# Patient Record
Sex: MALE | Race: WHITE | Employment: FULL TIME | URBAN - METROPOLITAN AREA
[De-identification: names, ages, dates, MRNs, and addresses within clinical notes are randomized per-mention and may not be internally consistent; named-entity substitution may affect disease eponyms.]

---

## 2017-04-13 DIAGNOSIS — I10 ESSENTIAL HYPERTENSION: ICD-10-CM

## 2017-04-13 RX ORDER — AMLODIPINE AND VALSARTAN 10; 320 MG/1; MG/1
TABLET ORAL
Qty: 90 TAB | Refills: 1 | Status: SHIPPED | OUTPATIENT
Start: 2017-04-13 | End: 2017-10-14 | Stop reason: SDUPTHER

## 2017-08-02 ENCOUNTER — TELEPHONE (OUTPATIENT)
Dept: INTERNAL MEDICINE CLINIC | Age: 47
End: 2017-08-02

## 2017-08-02 DIAGNOSIS — E78.1 HYPERTRIGLYCERIDEMIA: Primary | ICD-10-CM

## 2017-08-02 DIAGNOSIS — I10 ESSENTIAL HYPERTENSION: ICD-10-CM

## 2017-08-02 DIAGNOSIS — R73.01 FASTING HYPERGLYCEMIA: ICD-10-CM

## 2017-08-02 NOTE — TELEPHONE ENCOUNTER
----- Message from Licha Duff sent at 8/2/2017  1:35 PM EDT -----  Regarding: Dr. Lidia Dillon telephone  Contact: [phone]  Pt is requesting an order be put in for lab work with A1C before his appt on 8/31/17 so it can be reviewed at [facility].  Pt's best contact number is [phone]

## 2017-08-02 NOTE — TELEPHONE ENCOUNTER
Patient plans to come to the office to have lab collected. Order pending. Please add on addition orders if needed.  Patient wishes to have results available to discuss at office visit

## 2017-08-03 NOTE — TELEPHONE ENCOUNTER
Orders signed. Dr Dereck Osgood      Patient advised that lab orders have been signed and of lab office hours.    Left voicemail message with the above mentioned information

## 2017-10-14 DIAGNOSIS — I10 ESSENTIAL HYPERTENSION: ICD-10-CM

## 2017-10-16 RX ORDER — AMLODIPINE AND VALSARTAN 10; 320 MG/1; MG/1
TABLET ORAL
Qty: 90 TAB | Refills: 0 | Status: SHIPPED | OUTPATIENT
Start: 2017-10-16 | End: 2018-01-23 | Stop reason: SDUPTHER

## 2017-10-17 NOTE — TELEPHONE ENCOUNTER
Attempted to contact patient without success.  Left voicemail message requesting a call back to the office

## 2018-01-23 DIAGNOSIS — I10 ESSENTIAL HYPERTENSION: ICD-10-CM

## 2018-01-24 RX ORDER — AMLODIPINE AND VALSARTAN 10; 320 MG/1; MG/1
TABLET ORAL
Qty: 90 TAB | Refills: 0 | Status: SHIPPED | OUTPATIENT
Start: 2018-01-24 | End: 2018-04-28 | Stop reason: SDUPTHER

## 2018-01-25 ENCOUNTER — TELEPHONE (OUTPATIENT)
Dept: INTERNAL MEDICINE CLINIC | Age: 48
End: 2018-01-25

## 2018-02-26 ENCOUNTER — OFFICE VISIT (OUTPATIENT)
Dept: INTERNAL MEDICINE CLINIC | Age: 48
End: 2018-02-26

## 2018-02-26 VITALS
HEART RATE: 86 BPM | BODY MASS INDEX: 37.32 KG/M2 | TEMPERATURE: 98.5 F | SYSTOLIC BLOOD PRESSURE: 160 MMHG | OXYGEN SATURATION: 98 % | DIASTOLIC BLOOD PRESSURE: 90 MMHG | RESPIRATION RATE: 16 BRPM | WEIGHT: 224 LBS | HEIGHT: 65 IN

## 2018-02-26 DIAGNOSIS — E78.1 HYPERTRIGLYCERIDEMIA: ICD-10-CM

## 2018-02-26 DIAGNOSIS — I10 ESSENTIAL HYPERTENSION: Primary | ICD-10-CM

## 2018-02-26 RX ORDER — PRAVASTATIN SODIUM 40 MG/1
TABLET ORAL
Qty: 90 TAB | Refills: 3 | Status: SHIPPED | OUTPATIENT
Start: 2018-02-26

## 2018-02-26 NOTE — PATIENT INSTRUCTIONS
Follow a calorie controlled, low sodium / DASH diet. Get regular aerobic exercise. Monitor your blood pressure outside of the office. Lose 20 pounds over the next 6 months. DASH Diet: Care Instructions  Your Care Instructions    The DASH diet is an eating plan that can help lower your blood pressure. DASH stands for Dietary Approaches to Stop Hypertension. Hypertension is high blood pressure. The DASH diet focuses on eating foods that are high in calcium, potassium, and magnesium. These nutrients can lower blood pressure. The foods that are highest in these nutrients are fruits, vegetables, low-fat dairy products, nuts, seeds, and legumes. But taking calcium, potassium, and magnesium supplements instead of eating foods that are high in those nutrients does not have the same effect. The DASH diet also includes whole grains, fish, and poultry. The DASH diet is one of several lifestyle changes your doctor may recommend to lower your high blood pressure. Your doctor may also want you to decrease the amount of sodium in your diet. Lowering sodium while following the DASH diet can lower blood pressure even further than just the DASH diet alone. Follow-up care is a key part of your treatment and safety. Be sure to make and go to all appointments, and call your doctor if you are having problems. It's also a good idea to know your test results and keep a list of the medicines you take. How can you care for yourself at home? Following the DASH diet  · Eat 4 to 5 servings of fruit each day. A serving is 1 medium-sized piece of fruit, ½ cup chopped or canned fruit, 1/4 cup dried fruit, or 4 ounces (½ cup) of fruit juice. Choose fruit more often than fruit juice. · Eat 4 to 5 servings of vegetables each day. A serving is 1 cup of lettuce or raw leafy vegetables, ½ cup of chopped or cooked vegetables, or 4 ounces (½ cup) of vegetable juice. Choose vegetables more often than vegetable juice.   · Get 2 to 3 servings of low-fat and fat-free dairy each day. A serving is 8 ounces of milk, 1 cup of yogurt, or 1 ½ ounces of cheese. · Eat 6 to 8 servings of grains each day. A serving is 1 slice of bread, 1 ounce of dry cereal, or ½ cup of cooked rice, pasta, or cooked cereal. Try to choose whole-grain products as much as possible. · Limit lean meat, poultry, and fish to 2 servings each day. A serving is 3 ounces, about the size of a deck of cards. · Eat 4 to 5 servings of nuts, seeds, and legumes (cooked dried beans, lentils, and split peas) each week. A serving is 1/3 cup of nuts, 2 tablespoons of seeds, or ½ cup of cooked beans or peas. · Limit fats and oils to 2 to 3 servings each day. A serving is 1 teaspoon of vegetable oil or 2 tablespoons of salad dressing. · Limit sweets and added sugars to 5 servings or less a week. A serving is 1 tablespoon jelly or jam, ½ cup sorbet, or 1 cup of lemonade. · Eat less than 2,300 milligrams (mg) of sodium a day. If you limit your sodium to 1,500 mg a day, you can lower your blood pressure even more. Tips for success  · Start small. Do not try to make dramatic changes to your diet all at once. You might feel that you are missing out on your favorite foods and then be more likely to not follow the plan. Make small changes, and stick with them. Once those changes become habit, add a few more changes. · Try some of the following:  ¨ Make it a goal to eat a fruit or vegetable at every meal and at snacks. This will make it easy to get the recommended amount of fruits and vegetables each day. ¨ Try yogurt topped with fruit and nuts for a snack or healthy dessert. ¨ Add lettuce, tomato, cucumber, and onion to sandwiches. ¨ Combine a ready-made pizza crust with low-fat mozzarella cheese and lots of vegetable toppings. Try using tomatoes, squash, spinach, broccoli, carrots, cauliflower, and onions.   ¨ Have a variety of cut-up vegetables with a low-fat dip as an appetizer instead of chips and dip.  ¨ Sprinkle sunflower seeds or chopped almonds over salads. Or try adding chopped walnuts or almonds to cooked vegetables. ¨ Try some vegetarian meals using beans and peas. Add garbanzo or kidney beans to salads. Make burritos and tacos with mashed landin beans or black beans. Where can you learn more? Go to http://ifrah-demetrio.info/. Enter E335 in the search box to learn more about \"DASH Diet: Care Instructions. \"  Current as of: September 21, 2016  Content Version: 11.4  © 1629-7597 [employer]. Care instructions adapted under license by [employer] (which disclaims liability or warranty for this information). If you have questions about a medical condition or this instruction, always ask your healthcare professional. [address] any warranty or liability for your use of this information.

## 2018-02-26 NOTE — MR AVS SNAPSHOT
[address] [facility], [address] [place] [address] 
[phone] Patient: Missy Bailey MRN: [id number] Visit Information Date & Time Provider Department Dept. Phone Encounter #  
 2/26/2018  9:45 AM MD [facility] [phone] [id number] Follow-up Instructions Return in about 6 months (around 8/26/2018) for F/U HTN. Upcoming Health Maintenance Date Due Pneumococcal 19-64 Highest Risk (1 of 3 - PCV13) 11/16/1991 Influenza Age 5 to Adult 8/1/2017 DTaP/Tdap/Td series (2 - Td) 2/28/2026 Allergies as of 2/26/2018  Review Complete On: 2/26/2018 By: Conor Donohue MD  
  
 Severity Noted Reaction Type Reactions Iodine High 04/18/2014    Anaphylaxis Shrimp and lobster Pcn [Penicillins]  12/18/2012    Unknown (comments) Unsure what happens, childhood reaction Current Immunizations  Never Reviewed Name Date Tdap 2/29/2016 Not reviewed this visit You Were Diagnosed With   
  
 Codes Comments Essential hypertension    -  Primary ICD-10-CM: I10 
ICD-9-CM: 401.9 Hypertriglyceridemia     ICD-10-CM: E78.1 ICD-9-CM: 272.1 Obesity, Class II, BMI 35-39.9, with comorbidity     ICD-10-CM: E66.9 ICD-9-CM: 278.00 Vitals BP Pulse Temp Resp Height(growth percentile) Weight(growth percentile) 160/90 (BP 1 Location: Left arm, BP Patient Position: Sitting) 86 98.5 °F (36.9 °C) (Oral) 16 5' 5.25\" (1.657 m) 224 lb (101.6 kg) SpO2 BMI Smoking Status 98% 36.99 kg/m2 Never Smoker BMI and BSA Data Body Mass Index Body Surface Area  
 36.99 kg/m 2 2.16 m 2 Preferred Pharmacy Pharmacy Name Phone [id number] [place], [address] [facility] [phone] Your Updated Medication List  
  
   
 This list is accurate as of 2/26/18 10:34 AM.  Always use your most recent med list. amLODIPine-valsartan  mg per tablet Commonly known as:  EXFORGE  
TAKE 1 TAB BY MOUTH DAILY. omega-3 acid ethyl esters 1 gram capsule Commonly known as:  Deborha Earthly Take 2 Caps by mouth two (2) times a day. pravastatin 40 mg tablet Commonly known as:  PRAVACHOL  
TAKE 1 TAB BY MOUTH NIGHTLY. Prescriptions Sent to Pharmacy Refills  
 pravastatin (PRAVACHOL) 40 mg tablet 3 Sig: TAKE 1 TAB BY MOUTH NIGHTLY. Class: Normal  
 Pharmacy: [id number] [place], [address] [facility] #: [phone] Follow-up Instructions Return in about 6 months (around 8/26/2018) for F/U HTN. To-Do List   
 03/26/2018 Lab:  LIPID PANEL   
  
 03/26/2018 Lab:  METABOLIC PANEL, COMPREHENSIVE   
  
 03/26/2018 Lab:  TSH REFLEX TO T4 Patient Instructions Follow a calorie controlled, low sodium / DASH diet. Get regular aerobic exercise. Monitor your blood pressure outside of the office. Lose 20 pounds over the next 6 months. DASH Diet: Care Instructions Your Care Instructions The DASH diet is an eating plan that can help lower your blood pressure. DASH stands for Dietary Approaches to Stop Hypertension. Hypertension is high blood pressure. The DASH diet focuses on eating foods that are high in calcium, potassium, and magnesium. These nutrients can lower blood pressure. The foods that are highest in these nutrients are fruits, vegetables, low-fat dairy products, nuts, seeds, and legumes. But taking calcium, potassium, and magnesium supplements instead of eating foods that are high in those nutrients does not have the same effect. The DASH diet also includes whole grains, fish, and poultry.  
The DASH diet is one of several lifestyle changes your doctor may recommend to lower your high blood pressure. Your doctor may also want you to decrease the amount of sodium in your diet. Lowering sodium while following the DASH diet can lower blood pressure even further than just the DASH diet alone. Follow-up care is a key part of your treatment and safety. Be sure to make and go to all appointments, and call your doctor if you are having problems. It's also a good idea to know your test results and keep a list of the medicines you take. How can you care for yourself at home? Following the DASH diet · Eat 4 to 5 servings of fruit each day. A serving is 1 medium-sized piece of fruit, ½ cup chopped or canned fruit, 1/4 cup dried fruit, or 4 ounces (½ cup) of fruit juice. Choose fruit more often than fruit juice. · Eat 4 to 5 servings of vegetables each day. A serving is 1 cup of lettuce or raw leafy vegetables, ½ cup of chopped or cooked vegetables, or 4 ounces (½ cup) of vegetable juice. Choose vegetables more often than vegetable juice. · Get 2 to 3 servings of low-fat and fat-free dairy each day. A serving is 8 ounces of milk, 1 cup of yogurt, or 1 ½ ounces of cheese. · Eat 6 to 8 servings of grains each day. A serving is 1 slice of bread, 1 ounce of dry cereal, or ½ cup of cooked rice, pasta, or cooked cereal. Try to choose whole-grain products as much as possible. · Limit lean meat, poultry, and fish to 2 servings each day. A serving is 3 ounces, about the size of a deck of cards. · Eat 4 to 5 servings of nuts, seeds, and legumes (cooked dried beans, lentils, and split peas) each week. A serving is 1/3 cup of nuts, 2 tablespoons of seeds, or ½ cup of cooked beans or peas. · Limit fats and oils to 2 to 3 servings each day. A serving is 1 teaspoon of vegetable oil or 2 tablespoons of salad dressing. · Limit sweets and added sugars to 5 servings or less a week. A serving is 1 tablespoon jelly or jam, ½ cup sorbet, or 1 cup of lemonade. · Eat less than 2,300 milligrams (mg) of sodium a day. If you limit your sodium to 1,500 mg a day, you can lower your blood pressure even more. Tips for success · Start small. Do not try to make dramatic changes to your diet all at once. You might feel that you are missing out on your favorite foods and then be more likely to not follow the plan. Make small changes, and stick with them. Once those changes become habit, add a few more changes. · Try some of the following: ¨ Make it a goal to eat a fruit or vegetable at every meal and at snacks. This will make it easy to get the recommended amount of fruits and vegetables each day. ¨ Try yogurt topped with fruit and nuts for a snack or healthy dessert. ¨ Add lettuce, tomato, cucumber, and onion to sandwiches. ¨ Combine a ready-made pizza crust with low-fat mozzarella cheese and lots of vegetable toppings. Try using tomatoes, squash, spinach, broccoli, carrots, cauliflower, and onions. ¨ Have a variety of cut-up vegetables with a low-fat dip as an appetizer instead of chips and dip. ¨ Sprinkle sunflower seeds or chopped almonds over salads. Or try adding chopped walnuts or almonds to cooked vegetables. ¨ Try some vegetarian meals using beans and peas. Add garbanzo or kidney beans to salads. Make burritos and tacos with mashed landin beans or black beans. Where can you learn more? Go to http://ifrah-demetrio.info/. Enter O969 in the search box to learn more about \"DASH Diet: Care Instructions. \" Current as of: September 21, 2016 Content Version: 11.4 © 6105-1082 [employer]. Care instructions adapted under license by [employer] (which disclaims liability or warranty for this information). If you have questions about a medical condition or this instruction, always ask your healthcare professional. [address] any warranty or liability for your use of this information. Introducing [facility] [place] [facility]! Dear Jennifer Chandra: 
Thank you for requesting a [employer] account. Our records indicate that you have previously registered for a [employer] account but its currently inactive. Please call our [employer] support line at [phone]. Additional Information If you have questions, please visit the Frequently Asked Questions section of the [employer] website at [email or web address] Remember, [employer] is NOT to be used for urgent needs. For medical emergencies, dial 911. Now available from your iPhone and Android! Please provide this summary of care documentation to your next provider. Your primary care clinician is listed as Ailyn Klein. If you have any questions after today's visit, please call [phone].

## 2018-02-26 NOTE — PROGRESS NOTES
Chief Complaint   Patient presents with    Medication Evaluation     Reviewed record in preparation for visit and have obtained necessary documentation. Identified pt with two pt identifiers(name and ). Health Maintenance Due   Topic    Pneumococcal 19-64 Highest Risk (1 of 3 - PCV13)    Influenza Age 5 to Adult          Chief Complaint   Patient presents with    Medication Evaluation        Wt Readings from Last 3 Encounters:   18 224 lb (101.6 kg)   16 225 lb 8 oz (102.3 kg)   16 221 lb 6.4 oz (100.4 kg)     Temp Readings from Last 3 Encounters:   18 98.5 °F (36.9 °C) (Oral)   16 99.2 °F (37.3 °C) (Oral)   16 97.9 °F (36.6 °C) (Oral)     BP Readings from Last 3 Encounters:   18 160/90   16 160/80   16 150/88     Pulse Readings from Last 3 Encounters:   18 86   16 62   16 68           Learning Assessment:  :     Learning Assessment 2012   PRIMARY LEARNER Patient Patient Patient Patient   HIGHEST LEVEL OF EDUCATION - PRIMARY LEARNER  > 4 YEARS OF COLLEGE > 4 YEARS OF COLLEGE - -   BARRIERS PRIMARY LEARNER NONE NONE NONE -   CO-LEARNER CAREGIVER No No - -   PRIMARY LANGUAGE ENGLISH ENGLISH ENGLISH ENGLISH    NEED No No - -   LEARNER PREFERENCE PRIMARY READING LISTENING DEMONSTRATION LISTENING     - - - DEMONSTRATION   LEARNING SPECIAL TOPICS no no - -   ANSWERED BY patient patient patient patient   RELATIONSHIP SELF SELF SELF SELF   ASSESSMENT COMMENT - none - -       Depression Screening:  :     PHQ over the last two weeks 2018   Little interest or pleasure in doing things Not at all   Feeling down, depressed or hopeless Not at all   Total Score PHQ 2 0       Fall Risk Assessment:  :     No flowsheet data found. Abuse Screening:  :     Abuse Screening Questionnaire 2016   Do you ever feel afraid of your partner?  N N N N   Are you in a relationship with someone who physically or mentally threatens you? N N N N   Is it safe for you to go home? Y Y Y Y       Coordination of Care Questionnaire:  :     1) Have you been to an emergency room, urgent care clinic since your last visit? no   Hospitalized since your last visit? no             2) Have you seen or consulted any other health care providers outside of [address] [place] [address] since your last visit? no  (Include any pap smears or colon screenings in this section.)    3) Do you have an Advance Directive on file? no    4) Are you interested in receiving information on Advance Directives? NO      Patient is accompanied by self I have received verbal consent from Abbey Coronado to discuss any/all medical information while they are present in the room. Reviewed record  In preparation for visit and have obtained necessary documentation.

## 2018-02-26 NOTE — PROGRESS NOTES
Subjective:     Chief Complaint   Patient presents with    Medication Evaluation     He  is a 41y.o. year old male who presents for evaluation. Tries to avoid the salt. BP runs better outside of the office. Has been out of statin for past month. Getting ready for cycling season. Historical Data    Past Medical History:   Diagnosis Date    Asthma     Calculus of kidney 2005    1 time    Environmental allergies     GERD (gastroesophageal reflux disease)     Gout 06/12 most recent    2 episodes    Hodgkin's disease, unspecified 1997    remission since dx    Hypertension         Past Surgical History:   Procedure Laterality Date    CHEST SURGERY PROCEDURE UNLISTED  1997    LN dissection chest    HX APPENDECTOMY  1996    HX ORTHOPAEDIC          Outpatient Encounter Prescriptions as of 2/26/2018   Medication Sig Dispense Refill    amLODIPine-valsartan (EXFORGE)  mg per tablet TAKE 1 TAB BY MOUTH DAILY. 90 Tab 0    pravastatin (PRAVACHOL) 40 mg tablet TAKE 1 TAB BY MOUTH NIGHTLY. 90 Tab 2    omega-3 acid ethyl esters (LOVAZA) 1 gram capsule Take 2 Caps by mouth two (2) times a day. 180 Cap 3    tadalafil (CIALIS) 10 mg tablet Take 10 mg by mouth as needed. Indications: ERECTILE DYSFUNCTION 8 Tab 3    albuterol (PROAIR HFA) 90 mcg/actuation inhaler Take 2 Puffs by inhalation every six (6) hours as needed for Wheezing. 1 Inhaler 1     No facility-administered encounter medications on file as of 2/26/2018. Allergies   Allergen Reactions    Iodine Anaphylaxis     Shrimp and lobster    Pcn [Penicillins] Unknown (comments)     Unsure what happens, childhood reaction        Social History     Social History    Marital status:      Spouse name: N/A    Number of children: N/A    Years of education: N/A     Occupational History    Not on file.      Social History Main Topics    Smoking status: Never Smoker    Smokeless tobacco: Never Used    Alcohol use 1.2 oz/week     2 Standard drinks or equivalent per week    Drug use: No    Sexual activity: Yes     Partners: Female     Other Topics Concern    Not on file     Social History Narrative        Review of Systems  Pertinent items are noted in HPI. Objective:     Vitals:    02/26/18 1016   BP: 160/90   Pulse: 86   Resp: 16   Temp: 98.5 °F (36.9 °C)   TempSrc: Oral   SpO2: 98%   Weight: 224 lb (101.6 kg)   Height: 5' 5.25\" (1.657 m)     Pleasant moderately obese WM in no acute distress. Neck: Supple. Cardiac: RRR without murmurs, gallops or rubs. Lungs: Clear to auscultation. Abdomen:  Soft and non-tender. ASSESSMENT / PLAN:   1. Essential hypertension  · Low sodium / DASH diet. · Continue Exforge. · Monitor BP away from office and if >135/85, call for medication adjustment. · Weight loss advised. - METABOLIC PANEL, COMPREHENSIVE; Future    2. Hypertriglyceridemia  · Continue diet efforts / avoid high glycemic index foods. - pravastatin (PRAVACHOL) 40 mg tablet; TAKE 1 TAB BY MOUTH NIGHTLY. Dispense: 90 Tab; Refill: 3  - LIPID PANEL; Future    3. Obesity, Class II, BMI 35-39.9, with comorbidity  I have reviewed/discussed the above normal BMI with the patient. I have recommended the following interventions: dietary management education, guidance, and counseling . Margoth Taylor - TSH REFLEX TO T4; Future    Patient Instructions   Follow a calorie controlled, low sodium / DASH diet. Get regular aerobic exercise. Monitor your blood pressure outside of the office. Lose 20 pounds over the next 6 months. DASH Diet: Care Instructions  Your Care Instructions    The DASH diet is an eating plan that can help lower your blood pressure. DASH stands for Dietary Approaches to Stop Hypertension. Hypertension is high blood pressure. The DASH diet focuses on eating foods that are high in calcium, potassium, and magnesium. These nutrients can lower blood pressure.  The foods that are highest in these nutrients are fruits, vegetables, low-fat dairy products, nuts, seeds, and legumes. But taking calcium, potassium, and magnesium supplements instead of eating foods that are high in those nutrients does not have the same effect. The DASH diet also includes whole grains, fish, and poultry. The DASH diet is one of several lifestyle changes your doctor may recommend to lower your high blood pressure. Your doctor may also want you to decrease the amount of sodium in your diet. Lowering sodium while following the DASH diet can lower blood pressure even further than just the DASH diet alone. Follow-up care is a key part of your treatment and safety. Be sure to make and go to all appointments, and call your doctor if you are having problems. It's also a good idea to know your test results and keep a list of the medicines you take. How can you care for yourself at home? Following the DASH diet  · Eat 4 to 5 servings of fruit each day. A serving is 1 medium-sized piece of fruit, ½ cup chopped or canned fruit, 1/4 cup dried fruit, or 4 ounces (½ cup) of fruit juice. Choose fruit more often than fruit juice. · Eat 4 to 5 servings of vegetables each day. A serving is 1 cup of lettuce or raw leafy vegetables, ½ cup of chopped or cooked vegetables, or 4 ounces (½ cup) of vegetable juice. Choose vegetables more often than vegetable juice. · Get 2 to 3 servings of low-fat and fat-free dairy each day. A serving is 8 ounces of milk, 1 cup of yogurt, or 1 ½ ounces of cheese. · Eat 6 to 8 servings of grains each day. A serving is 1 slice of bread, 1 ounce of dry cereal, or ½ cup of cooked rice, pasta, or cooked cereal. Try to choose whole-grain products as much as possible. · Limit lean meat, poultry, and fish to 2 servings each day. A serving is 3 ounces, about the size of a deck of cards. · Eat 4 to 5 servings of nuts, seeds, and legumes (cooked dried beans, lentils, and split peas) each week.  A serving is 1/3 cup of nuts, 2 tablespoons of seeds, or ½ cup of cooked beans or peas. · Limit fats and oils to 2 to 3 servings each day. A serving is 1 teaspoon of vegetable oil or 2 tablespoons of salad dressing. · Limit sweets and added sugars to 5 servings or less a week. A serving is 1 tablespoon jelly or jam, ½ cup sorbet, or 1 cup of lemonade. · Eat less than 2,300 milligrams (mg) of sodium a day. If you limit your sodium to 1,500 mg a day, you can lower your blood pressure even more. Tips for success  · Start small. Do not try to make dramatic changes to your diet all at once. You might feel that you are missing out on your favorite foods and then be more likely to not follow the plan. Make small changes, and stick with them. Once those changes become habit, add a few more changes. · Try some of the following:  ¨ Make it a goal to eat a fruit or vegetable at every meal and at snacks. This will make it easy to get the recommended amount of fruits and vegetables each day. ¨ Try yogurt topped with fruit and nuts for a snack or healthy dessert. ¨ Add lettuce, tomato, cucumber, and onion to sandwiches. ¨ Combine a ready-made pizza crust with low-fat mozzarella cheese and lots of vegetable toppings. Try using tomatoes, squash, spinach, broccoli, carrots, cauliflower, and onions. ¨ Have a variety of cut-up vegetables with a low-fat dip as an appetizer instead of chips and dip. ¨ Sprinkle sunflower seeds or chopped almonds over salads. Or try adding chopped walnuts or almonds to cooked vegetables. ¨ Try some vegetarian meals using beans and peas. Add garbanzo or kidney beans to salads. Make burritos and tacos with mashed landin beans or black beans. Where can you learn more? Go to http://ifrah-demetrio.info/. Enter V698 in the search box to learn more about \"DASH Diet: Care Instructions. \"  Current as of: September 21, 2016  Content Version: 11.4  © 3177-7885 Healthwise, [employer].  Care instructions adapted under license by [employer] (which disclaims liability or warranty for this information). If you have questions about a medical condition or this instruction, always ask your healthcare professional. [place] [address] any warranty or liability for your use of this information. Follow-up Disposition:  Return in about 6 months (around 8/26/2018) for F/U HTN. Advised him to call back or return to office if symptoms worsen/change/persist.  Discussed expected course/resolution/complications of diagnosis in detail with patient. Medication risks/benefits/costs/interactions/alternatives discussed with patient. He was given an after visit summary which includes diagnoses, current medications, & vitals. He expressed understanding with the diagnosis and plan.

## 2018-04-19 ENCOUNTER — OFFICE VISIT (OUTPATIENT)
Dept: INTERNAL MEDICINE CLINIC | Age: 48
End: 2018-04-19

## 2018-04-19 VITALS
HEART RATE: 90 BPM | TEMPERATURE: 98.7 F | OXYGEN SATURATION: 98 % | WEIGHT: 219 LBS | SYSTOLIC BLOOD PRESSURE: 200 MMHG | BODY MASS INDEX: 36.49 KG/M2 | DIASTOLIC BLOOD PRESSURE: 110 MMHG | HEIGHT: 65 IN

## 2018-04-19 DIAGNOSIS — E66.01 SEVERE OBESITY (BMI 35.0-39.9) WITH COMORBIDITY (HCC): ICD-10-CM

## 2018-04-19 DIAGNOSIS — R80.1 PERSISTENT PROTEINURIA: ICD-10-CM

## 2018-04-19 DIAGNOSIS — I10 HYPERTENSION, ACCELERATED: Primary | ICD-10-CM

## 2018-04-19 RX ORDER — LABETALOL 100 MG/1
100 TABLET, FILM COATED ORAL 2 TIMES DAILY
Qty: 60 TAB | Refills: 2 | Status: SHIPPED | OUTPATIENT
Start: 2018-04-19 | End: 2018-07-02 | Stop reason: SDUPTHER

## 2018-04-19 NOTE — MR AVS SNAPSHOT
[address] [facility], [address] [place] [address] 
[phone] Patient: Adenike Mahoney MRN: [id number] ZLE:[id number] Visit Information Date & Time Provider Department Dept. Phone Encounter #  
 4/19/2018  1:30 PM [place] [address] [facility] [phone] [id number] Follow-up Instructions Return in about 2 weeks (around 5/3/2018) for F/U HTN. Upcoming Health Maintenance Date Due Pneumococcal 19-64 Highest Risk (1 of 3 - PCV13) 11/16/1991 DTaP/Tdap/Td series (2 - Td) 2/28/2026 Allergies as of 4/19/2018  Review Complete On: 4/19/2018 By: Elias Aguero MD  
  
 Severity Noted Reaction Type Reactions Iodine High 04/18/2014    Anaphylaxis Shrimp and lobster Pcn [Penicillins]  12/18/2012    Unknown (comments) Unsure what happens, childhood reaction Current Immunizations  Never Reviewed Name Date Tdap 2/29/2016 Not reviewed this visit You Were Diagnosed With   
  
 Codes Comments Hypertension, accelerated    -  Primary ICD-10-CM: I10 
ICD-9-CM: 401.0 Severe obesity (BMI 35.0-39. 9) with comorbidity ([facility]     ICD-10-CM: E66.01 
ICD-9-CM: 278.01 Persistent proteinuria     ICD-10-CM: R80.1 ICD-9-CM: 791.0 Vitals BP Pulse Temp Height(growth percentile) Weight(growth percentile) SpO2  
 (!) 200/110 (BP 1 Location: Left arm, BP Patient Position: Sitting) 90 98.7 °F (37.1 °C) (Oral) 5' 5.25\" (1.657 m) 219 lb (99.3 kg) 98% BMI Smoking Status 36.16 kg/m2 Never Smoker BMI and BSA Data Body Mass Index Body Surface Area  
 36.16 kg/m 2 2.14 m 2 Preferred Pharmacy Pharmacy Name Phone [id number] [place], [address] [facility] [phone] Your Updated Medication List  
  
   
This list is accurate as of 4/19/18  2:27 PM.  Always use your most recent med list.  
  
  
  
 amLODIPine-valsartan  mg per tablet Commonly known as:  EXFORGE  
TAKE 1 TAB BY MOUTH DAILY. CLARITIN PO Take  by mouth. FLONASE NA  
by Nasal route. labetalol 100 mg tablet Commonly known as:  Gio Eckert Take 1 Tab by mouth two (2) times a day. omega-3 acid ethyl esters 1 gram capsule Commonly known as:  Mickeal Lanham Take 2 Caps by mouth two (2) times a day. pravastatin 40 mg tablet Commonly known as:  PRAVACHOL  
TAKE 1 TAB BY MOUTH NIGHTLY. Prescriptions Sent to Pharmacy Refills  
 labetalol (NORMODYNE) 100 mg tablet 2 Sig: Take 1 Tab by mouth two (2) times a day. Class: Normal  
 Pharmacy: [facility]/pharmacy #[id number]- [place], [address] [facility] Ph #: [phone] Route: Oral  
  
We Performed the Following AMB POC EKG ROUTINE W/ 12 LEADS, INTER & REP [99313 CPT(R)] LESLEY W/REFLEX [26954 CPT(R)] ANTI-NEUTROPHIL CYTOPLASMIC AB [id number] CPT(R)] COMPLEMENT, C3 [id number] CPT(R)] COMPLEMENT, C4 [id number] CPT(R)] HCV AB W/RFLX TO NELIDA [92526 CPT(R)] HEP B SURFACE AG [id number] CPT(R)] PROT+CREATU (RANDOM) [id number] CPT(R)] REFERRAL TO NEPHROLOGY [QGX58 Custom] Comments:  
 Refer to Dr Germain Washington Follow-up Instructions Return in about 2 weeks (around 5/3/2018) for F/U HTN. Referral Information Referral ID Referred By Referred To  
  
 [id number] [facility]   
   [address] [facility], [address] Visits Status Start Date End Date 1 New Request 4/19/18 4/19/19 If your referral has a status of pending review or denied, additional information will be sent to support the outcome of this decision. Patient Instructions Have additional testing done. See Kidney specialist. 
Start labetalol 100 mg twice a day. Introducing [facility]! Dear Xavier Elmore: Thank you for requesting a Content Raven account. Our records indicate that you have previously registered for a Content Raven account but its currently inactive. Please call our Content Raven support line at [phone]. Additional Information If you have questions, please visit the Frequently Asked Questions section of the Content Raven website at [email or web address] Remember, Content Raven is NOT to be used for urgent needs. For medical emergencies, dial 911. Now available from your iPhone and Android! Please provide this summary of care documentation to your next provider. Your primary care clinician is listed as Maria De Jesus Yeager. If you have any questions after today's visit, please call [phone].

## 2018-04-19 NOTE — PROGRESS NOTES
Chief Complaint   Patient presents with    Results    Hypertension     Reviewed record in preparation for visit and have obtained necessary documentation. Identified pt with two pt identifiers(name and ). Health Maintenance Due   Topic    Pneumococcal 19-64 Highest Risk (1 of 3 - PCV13)         Chief Complaint   Patient presents with    Results    Hypertension        Wt Readings from Last 3 Encounters:   18 219 lb (99.3 kg)   18 224 lb (101.6 kg)   16 225 lb 8 oz (102.3 kg)     Temp Readings from Last 3 Encounters:   18 98.7 °F (37.1 °C) (Oral)   18 98.5 °F (36.9 °C) (Oral)   16 99.2 °F (37.3 °C) (Oral)     BP Readings from Last 3 Encounters:   18 (!) 200/110   18 160/90   16 160/80     Pulse Readings from Last 3 Encounters:   18 90   18 86   16 62           Learning Assessment:  :     Learning Assessment 2012   PRIMARY LEARNER Patient Patient Patient Patient   HIGHEST LEVEL OF EDUCATION - PRIMARY LEARNER  > 4 YEARS OF COLLEGE > 4 YEARS OF COLLEGE - -   BARRIERS PRIMARY LEARNER NONE NONE NONE -   CO-LEARNER CAREGIVER No No - -   PRIMARY LANGUAGE ENGLISH ENGLISH ENGLISH ENGLISH    NEED No No - -   LEARNER PREFERENCE PRIMARY READING LISTENING DEMONSTRATION LISTENING     - - - DEMONSTRATION   LEARNING SPECIAL TOPICS no no - -   ANSWERED BY patient patient patient patient   RELATIONSHIP SELF SELF SELF SELF   ASSESSMENT COMMENT - none - -       Depression Screening:  :     PHQ over the last two weeks 2018   Little interest or pleasure in doing things Not at all   Feeling down, depressed or hopeless Not at all   Total Score PHQ 2 0       Fall Risk Assessment:  :     No flowsheet data found. Abuse Screening:  :     Abuse Screening Questionnaire 2016   Do you ever feel afraid of your partner?  N N N N   Are you in a relationship with someone who physically or mentally threatens you? N N N N   Is it safe for you to go home? Y Y Y Y       Coordination of Care Questionnaire:  :     1) Have you been to an emergency room, urgent care clinic since your last visit? no   Hospitalized since your last visit? no             2) Have you seen or consulted any other health care providers outside of [address] [place] [address] since your last visit? no  (Include any pap smears or colon screenings in this section.)    3) Do you have an Advance Directive on file? no    4) Are you interested in receiving information on Advance Directives? NO      Patient is accompanied by self I have received verbal consent from Senia Dias to discuss any/all medical information while they are present in the room. Reviewed record  In preparation for visit and have obtained necessary documentation.

## 2018-04-19 NOTE — PROGRESS NOTES
Subjective:     Chief Complaint   Patient presents with    Results    Hypertension     He  is a 41y.o. year old male who presents for evaluation. Patient had recent work based labs and found to have proteinuria (mid range). Repeat testing showed some improvement but not normalization. He also had a mild BS elevation. His BP has been in the 120-130/70-80 range outside of the office. Historical Data    Past Medical History:   Diagnosis Date    Asthma     Calculus of kidney 2005    1 time    Environmental allergies     GERD (gastroesophageal reflux disease)     Gout 06/12 most recent    2 episodes    Hodgkin's disease, unspecified 1997    remission since dx    Hypertension         Past Surgical History:   Procedure Laterality Date    CHEST SURGERY PROCEDURE UNLISTED  1997    LN dissection chest    HX APPENDECTOMY  1996    HX ORTHOPAEDIC          Outpatient Encounter Prescriptions as of 4/19/2018   Medication Sig Dispense Refill    FLUTICASONE PROPIONATE (FLONASE NA) by Nasal route.  LORATADINE (CLARITIN PO) Take  by mouth.  pravastatin (PRAVACHOL) 40 mg tablet TAKE 1 TAB BY MOUTH NIGHTLY. 90 Tab 3    amLODIPine-valsartan (EXFORGE)  mg per tablet TAKE 1 TAB BY MOUTH DAILY. 90 Tab 0    omega-3 acid ethyl esters (LOVAZA) 1 gram capsule Take 2 Caps by mouth two (2) times a day. 180 Cap 3     No facility-administered encounter medications on file as of 4/19/2018. Allergies   Allergen Reactions    Iodine Anaphylaxis     Shrimp and lobster    Pcn [Penicillins] Unknown (comments)     Unsure what happens, childhood reaction        Social History     Social History    Marital status:      Spouse name: N/A    Number of children: N/A    Years of education: N/A     Occupational History    Not on file.      Social History Main Topics    Smoking status: Never Smoker    Smokeless tobacco: Never Used    Alcohol use 1.2 oz/week     2 Standard drinks or equivalent per week  Drug use: No    Sexual activity: Yes     Partners: Female     Other Topics Concern    Not on file     Social History Narrative        Review of Systems  Pertinent items are noted in HPI. Vitals:    04/19/18 1333   BP: (!) 200/110   Pulse: 90   Temp: 98.7 °F (37.1 °C)   TempSrc: Oral   SpO2: 98%   Weight: 219 lb (99.3 kg)   Height: 5' 5.25\" (1.657 m)     Pleasant, somewhat anxious WM in no acute distress. Eyes: No retinopathy noted. Cardiac: RRR without murmurs, gallops or rubs. Lungs: Clear to auscultation. Abdomen: Bengn  EKG:  No LVH noted. ASSESSMENT / PLAN:   1. Hypertension, accelerated  · Low salt / DASH diet. · Continue Exforge (amlodipine-valsartan)  · Start labetalol 100 mg bid. - AMB POC EKG ROUTINE W/ 12 LEADS, INTER & REP  - REFERRAL TO NEPHROLOGY  - labetalol (NORMODYNE) 100 mg tablet; Take 1 Tab by mouth two (2) times a day. Dispense: 60 Tab; Refill: 2    2. Severe obesity (BMI 35.0-39. 9) with comorbidity ([address]  I have reviewed/discussed the above normal BMI with the patient. I have recommended the following interventions: dietary management education, guidance, and counseling . Alice Milton 3. Persistent proteinuria  · Proteinuria work up in progress. · See Nephrology. - PROT+CREATU (RANDOM)  - LESLEY W/REFLEX  - ANTI-NEUTROPHIL CYTOPLASMIC AB  - COMPLEMENT, C3  - COMPLEMENT, C4  - HEP B SURFACE AG  - HCV AB W/RFLX TO NELIDA  - REFERRAL TO NEPHROLOGY    Patient Instructions   Have additional testing done. See Kidney specialist.  Start labetalol 100 mg twice a day. Follow-up Disposition:  Return in about 2 weeks (around 5/3/2018) for F/U HTN. Advised him to call back or return to office if symptoms worsen/change/persist.  Discussed expected course/resolution/complications of diagnosis in detail with patient. Medication risks/benefits/costs/interactions/alternatives discussed with patient.   He was given an after visit summary which includes diagnoses, current medications, & vitals. He expressed understanding with the diagnosis and plan.

## 2018-04-20 LAB
C3 SERPL-MCNC: 147 MG/DL (ref 82–167)
C4 SERPL-MCNC: 26 MG/DL (ref 14–44)
CREAT UR-MCNC: 146.4 MG/DL
HCV AB S/CO SERPL IA: <0.1 S/CO RATIO (ref 0–0.9)
HCV AB SERPL QL IA: NORMAL
PROT UR-MCNC: 496.5 MG/DL
PROT/CREAT UR: 3391 MG/G CREAT (ref 0–200)

## 2018-04-23 ENCOUNTER — DOCUMENTATION ONLY (OUTPATIENT)
Dept: INTERNAL MEDICINE CLINIC | Age: 48
End: 2018-04-23

## 2018-05-11 ENCOUNTER — OFFICE VISIT (OUTPATIENT)
Dept: INTERNAL MEDICINE CLINIC | Age: 48
End: 2018-05-11

## 2018-05-11 VITALS
HEIGHT: 65 IN | OXYGEN SATURATION: 98 % | WEIGHT: 225.8 LBS | BODY MASS INDEX: 37.62 KG/M2 | DIASTOLIC BLOOD PRESSURE: 100 MMHG | SYSTOLIC BLOOD PRESSURE: 160 MMHG | HEART RATE: 84 BPM | RESPIRATION RATE: 16 BRPM | TEMPERATURE: 98.5 F

## 2018-05-11 DIAGNOSIS — R80.1 PERSISTENT PROTEINURIA: ICD-10-CM

## 2018-05-11 DIAGNOSIS — I10 ESSENTIAL HYPERTENSION: Primary | ICD-10-CM

## 2018-05-11 RX ORDER — HYDROCHLOROTHIAZIDE 25 MG/1
TABLET ORAL
COMMUNITY
Start: 2018-05-02 | End: 2018-05-11 | Stop reason: ALTCHOICE

## 2018-05-11 RX ORDER — FUROSEMIDE 20 MG/1
20 TABLET ORAL DAILY
Qty: 30 TAB | Refills: 2 | Status: SHIPPED | OUTPATIENT
Start: 2018-05-11 | End: 2018-08-04 | Stop reason: SDUPTHER

## 2018-05-11 NOTE — MR AVS SNAPSHOT
[address] [facility], [address] [place] [address] 
[phone] Patient: Dereje Thakur MRN: [id number] XAU:[id number] Visit Information Date & Time Provider Department Dept. Phone Encounter #  
 5/11/2018  1:30 PM Leon Mclaughlin MD [place] [address] [facility] [phone] [id number] Follow-up Instructions Return in about 5 weeks (around 6/15/2018). Upcoming Health Maintenance Date Due Pneumococcal 19-64 Highest Risk (1 of 3 - PCV13) 11/16/1991 Influenza Age 5 to Adult 8/1/2018 DTaP/Tdap/Td series (2 - Td) 2/28/2026 Allergies as of 5/11/2018  Review Complete On: 5/11/2018 By: Leon Mclaughlin MD  
  
 Severity Noted Reaction Type Reactions Iodine High 04/18/2014    Anaphylaxis Shrimp and lobster Pcn [Penicillins]  12/18/2012    Unknown (comments) Unsure what happens, childhood reaction Current Immunizations  Never Reviewed Name Date Tdap 2/29/2016 Not reviewed this visit You Were Diagnosed With   
  
 Codes Comments Essential hypertension    -  Primary ICD-10-CM: I10 
ICD-9-CM: 401.9 Persistent proteinuria     ICD-10-CM: R80.1 ICD-9-CM: 791.0 Vitals BP Pulse Temp Resp Height(growth percentile) Weight(growth percentile) (!) 160/100 (BP 1 Location: Right arm, BP Patient Position: Sitting) 84 98.5 °F (36.9 °C) (Oral) 16 5' 5.25\" (1.657 m) 225 lb 12.8 oz (102.4 kg) SpO2 BMI Smoking Status 98% 37.29 kg/m2 Never Smoker Vitals History BMI and BSA Data Body Mass Index Body Surface Area  
 37.29 kg/m 2 2.17 m 2 Preferred Pharmacy Pharmacy Name Phone [id number] [place], [address] [facility] [phone] Your Updated Medication List  
  
   
This list is accurate as of 5/11/18  1:48 PM.  Always use your most recent med list.  
  
  
  
  
 amLODIPine-valsartan  mg per tablet Commonly known as:  EXFORGE  
TAKE 1 TAB BY MOUTH DAILY. CLARITIN PO Take  by mouth. FLONASE NA  
by Nasal route. furosemide 20 mg tablet Commonly known as:  LASIX Take 1 Tab by mouth daily. Indications: hypertension  
  
 labetalol 100 mg tablet Commonly known as:  Eva Orlandock Take 1 Tab by mouth two (2) times a day. omega-3 acid ethyl esters 1 gram capsule Commonly known as:  Clinton Ditch Take 2 Caps by mouth two (2) times a day. pravastatin 40 mg tablet Commonly known as:  PRAVACHOL  
TAKE 1 TAB BY MOUTH NIGHTLY. Prescriptions Sent to Pharmacy Refills  
 furosemide (LASIX) 20 mg tablet 2 Sig: Take 1 Tab by mouth daily. Indications: hypertension Class: Normal  
 Pharmacy: [facility]/pharmacy #[id number] #: [phone] Route: Oral  
  
Follow-up Instructions Return in about 5 weeks (around 6/15/2018). Patient Instructions Stop HCTZ Start furosemide 20 mg once daily. Increase your labetalol to twice a day. Continue Exforge. Introducing [facility]! Dear Jack El: 
Thank you for requesting a adRise account. Our records indicate that you have previously registered for a adRise account but its currently inactive. Please call our adRise support line at [phone]. Additional Information If you have questions, please visit the Frequently Asked Questions section of the adRise website at [email or web address] Remember, adRise is NOT to be used for urgent needs. For medical emergencies, dial 911. Now available from your iPhone and Android! Please provide this summary of care documentation to your next provider. Your primary care clinician is listed as Kassy Cisneros. If you have any questions after today's visit, please call [phone].

## 2018-05-11 NOTE — PROGRESS NOTES
Chief Complaint   Patient presents with    Hypertension     Reviewed record in preparation for visit and have obtained necessary documentation. Identified pt with two pt identifiers(name and ). Health Maintenance Due   Topic    Pneumococcal 19-64 Highest Risk (1 of 3 - PCV13)         Chief Complaint   Patient presents with    Hypertension        Wt Readings from Last 3 Encounters:   18 225 lb 12.8 oz (102.4 kg)   18 219 lb (99.3 kg)   18 224 lb (101.6 kg)     Temp Readings from Last 3 Encounters:   18 98.5 °F (36.9 °C) (Oral)   18 98.7 °F (37.1 °C) (Oral)   18 98.5 °F (36.9 °C) (Oral)     BP Readings from Last 3 Encounters:   18 (!) 160/100   18 (!) 200/110   18 160/90     Pulse Readings from Last 3 Encounters:   18 84   18 90   18 86           Learning Assessment:  :     Learning Assessment 2012   PRIMARY LEARNER Patient Patient Patient Patient   HIGHEST LEVEL OF EDUCATION - PRIMARY LEARNER  > 4 YEARS OF COLLEGE > 4 YEARS OF COLLEGE - -   BARRIERS PRIMARY LEARNER NONE NONE NONE -   CO-LEARNER CAREGIVER No No - -   PRIMARY LANGUAGE ENGLISH ENGLISH ENGLISH ENGLISH    NEED No No - -   LEARNER PREFERENCE PRIMARY READING LISTENING DEMONSTRATION LISTENING     - - - DEMONSTRATION   LEARNING SPECIAL TOPICS no no - -   ANSWERED BY patient patient patient patient   RELATIONSHIP SELF SELF SELF SELF   ASSESSMENT COMMENT - none - -       Depression Screening:  :     PHQ over the last two weeks 2018   Little interest or pleasure in doing things Not at all   Feeling down, depressed or hopeless Not at all   Total Score PHQ 2 0       Fall Risk Assessment:  :     No flowsheet data found. Abuse Screening:  :     Abuse Screening Questionnaire 2016   Do you ever feel afraid of your partner?  N N N N   Are you in a relationship with someone who physically or mentally threatens you? N N N N   Is it safe for you to go home? Y Y Y Y       Coordination of Care Questionnaire:  :     1) Have you been to an emergency room, urgent care clinic since your last visit? no   Hospitalized since your last visit? no             2) Have you seen or consulted any other health care providers outside of [address] [place] [address] since your last visit? no  (Include any pap smears or colon screenings in this section.)    3) Do you have an Advance Directive on file? no    4) Are you interested in receiving information on Advance Directives? NO      Patient is accompanied by self I have received verbal consent from Jose Daniel Duque to discuss any/all medical information while they are present in the room. Reviewed record  In preparation for visit and have obtained necessary documentation.

## 2018-05-11 NOTE — PROGRESS NOTES
Subjective:     Chief Complaint   Patient presents with    Hypertension     He  is a 41y.o. year old male who presents for evaluation. BP outside of office is improved. Feeling lightheaded during day. Woozy feeling. Has been seeing Nephrologist Donal Gracia for assessment of proteinuria. Started on HCTZ. Wants to replace it with Lasix. Nephrologist thinks his proteinuria is due to chronic HTN and Ibuprofen. Historical Data    Past Medical History:   Diagnosis Date    Asthma     Calculus of kidney 2005    1 time    Environmental allergies     GERD (gastroesophageal reflux disease)     Gout 06/12 most recent    2 episodes    Hodgkin's disease, unspecified 1997    remission since dx    Hypertension         Past Surgical History:   Procedure Laterality Date    CHEST SURGERY PROCEDURE UNLISTED  1997    LN dissection chest    HX APPENDECTOMY  1996    HX ORTHOPAEDIC          Outpatient Encounter Prescriptions as of 5/11/2018   Medication Sig Dispense Refill    hydroCHLOROthiazide (HYDRODIURIL) 25 mg tablet       amLODIPine-valsartan (EXFORGE)  mg per tablet TAKE 1 TAB BY MOUTH DAILY. 90 Tab 2    FLUTICASONE PROPIONATE (FLONASE NA) by Nasal route.  LORATADINE (CLARITIN PO) Take  by mouth.  labetalol (NORMODYNE) 100 mg tablet Take 1 Tab by mouth two (2) times a day. 60 Tab 2    pravastatin (PRAVACHOL) 40 mg tablet TAKE 1 TAB BY MOUTH NIGHTLY. 90 Tab 3    omega-3 acid ethyl esters (LOVAZA) 1 gram capsule Take 2 Caps by mouth two (2) times a day. 180 Cap 3     No facility-administered encounter medications on file as of 5/11/2018. Allergies   Allergen Reactions    Iodine Anaphylaxis     Shrimp and lobster    Pcn [Penicillins] Unknown (comments)     Unsure what happens, childhood reaction        Social History     Social History    Marital status:      Spouse name: N/A    Number of children: N/A    Years of education: N/A     Occupational History    Not on file. Social History Main Topics    Smoking status: Never Smoker    Smokeless tobacco: Never Used    Alcohol use 1.2 oz/week     2 Standard drinks or equivalent per week    Drug use: No    Sexual activity: Yes     Partners: Female     Other Topics Concern    Not on file     Social History Narrative        Review of Systems  Pertinent items are noted in HPI. Objective:     Vitals:    05/11/18 1330   BP: (!) 160/100   Pulse: 84   Resp: 16   Temp: 98.5 °F (36.9 °C)   TempSrc: Oral   SpO2: 98%   Weight: 225 lb 12.8 oz (102.4 kg)   Height: 5' 5.25\" (1.657 m)     Pleasant WM in no acute distress. Cardiac: RRR without murmurs, gallops or rubs. Lungs: Clear to auscultation. ASSESSMENT / PLAN:   1. Essential hypertension  · Low sodium / DASH diet. · Continue amlodipine-valsartan  · Switch from HCTZ to furosemide.  - furosemide (LASIX) 20 mg tablet; Take 1 Tab by mouth daily. Indications: hypertension  Dispense: 30 Tab; Refill: 2    2. Persistent proteinuria  · Follow up with Nephrologist.             Follow-up Disposition:  Return in about 5 weeks (around 6/15/2018). Advised him to call back or return to office if symptoms worsen/change/persist.  Discussed expected course/resolution/complications of diagnosis in detail with patient. Medication risks/benefits/costs/interactions/alternatives discussed with patient. He was given an after visit summary which includes diagnoses, current medications, & vitals. He expressed understanding with the diagnosis and plan.

## 2018-05-11 NOTE — PATIENT INSTRUCTIONS
Stop HCTZ  Start furosemide 20 mg once daily. Increase your labetalol to twice a day. Continue Exforge.

## 2018-07-02 DIAGNOSIS — I10 HYPERTENSION, ACCELERATED: ICD-10-CM

## 2018-07-02 RX ORDER — LABETALOL 100 MG/1
100 TABLET, FILM COATED ORAL 2 TIMES DAILY
Qty: 180 TAB | Refills: 0 | Status: SHIPPED | OUTPATIENT
Start: 2018-07-02 | End: 2018-09-04 | Stop reason: SDUPTHER

## 2018-07-02 NOTE — TELEPHONE ENCOUNTER
Requested Prescriptions     Pending Prescriptions Disp Refills    labetalol (NORMODYNE) 100 mg tablet 60 Tab 2     Sig: Take 1 Tab by mouth two (2) times a day. Patient's insurance will only cover 90 day supply.     05/11/2018  No upcoming    [facility] on [facility]

## 2018-08-04 DIAGNOSIS — I10 ESSENTIAL HYPERTENSION: ICD-10-CM

## 2018-08-05 RX ORDER — FUROSEMIDE 20 MG/1
TABLET ORAL
Qty: 30 TAB | Refills: 2 | Status: SHIPPED | OUTPATIENT
Start: 2018-08-05 | End: 2018-11-10 | Stop reason: SDUPTHER

## 2018-09-04 DIAGNOSIS — I10 HYPERTENSION, ACCELERATED: ICD-10-CM

## 2018-09-05 RX ORDER — LABETALOL 100 MG/1
TABLET, FILM COATED ORAL
Qty: 180 TAB | Refills: 0 | Status: SHIPPED | OUTPATIENT
Start: 2018-09-05 | End: 2019-05-13 | Stop reason: ALTCHOICE

## 2018-11-10 DIAGNOSIS — I10 ESSENTIAL HYPERTENSION: ICD-10-CM

## 2018-11-10 RX ORDER — FUROSEMIDE 20 MG/1
TABLET ORAL
Qty: 30 TAB | Refills: 2 | Status: SHIPPED | OUTPATIENT
Start: 2018-11-10 | End: 2018-12-05 | Stop reason: SDUPTHER

## 2018-12-05 DIAGNOSIS — I10 ESSENTIAL HYPERTENSION: ICD-10-CM

## 2018-12-05 RX ORDER — FUROSEMIDE 20 MG/1
TABLET ORAL
Qty: 90 TAB | Refills: 2 | Status: SHIPPED | OUTPATIENT
Start: 2018-12-05

## 2018-12-05 NOTE — TELEPHONE ENCOUNTER
PCP: Bay Mcintyre MD    Last appt: 5/11/2018  No future appointments. Requested Prescriptions     Pending Prescriptions Disp Refills    furosemide (LASIX) 20 mg tablet 90 Tab 2     Pharmacy requested 90 days supply.

## 2019-02-27 ENCOUNTER — HOSPITAL ENCOUNTER (OUTPATIENT)
Age: 49
Setting detail: OBSERVATION
Discharge: HOME | End: 2019-02-28
Attending: INTERNAL MEDICINE | Admitting: INTERNAL MEDICINE
Payer: COMMERCIAL

## 2019-02-27 ENCOUNTER — HOSPITAL ENCOUNTER (OUTPATIENT)
Dept: CT IMAGING | Age: 49
Discharge: HOME | End: 2019-02-27
Attending: INTERNAL MEDICINE
Payer: COMMERCIAL

## 2019-02-27 DIAGNOSIS — R80.1 PERSISTENT PROTEINURIA: ICD-10-CM

## 2019-02-27 DIAGNOSIS — R10.0 ACUTE ABDOMINAL PAIN SYNDROME: ICD-10-CM

## 2019-02-27 PROBLEM — R80.9 PROTEINURIA: Status: ACTIVE | Noted: 2019-02-27

## 2019-02-27 LAB
ABO + RH BLD: NORMAL
BLOOD GROUP ANTIBODIES SERPL: NORMAL
ERYTHROCYTE [DISTWIDTH] IN BLOOD BY AUTOMATED COUNT: 13.3 % (ref 11.5–14.5)
HCT VFR BLD AUTO: 40.9 % (ref 36.6–50.3)
HGB BLD-MCNC: 13.8 G/DL (ref 12.1–17)
INR PPP: 1 (ref 0.9–1.1)
MCH RBC QN AUTO: 30 PG (ref 26–34)
MCHC RBC AUTO-ENTMCNC: 33.7 G/DL (ref 30–36.5)
MCV RBC AUTO: 88.9 FL (ref 80–99)
NRBC # BLD: 0 K/UL (ref 0–0.01)
NRBC BLD-RTO: 0 PER 100 WBC
PLATELET # BLD AUTO: 185 K/UL (ref 150–400)
PMV BLD AUTO: 10.4 FL (ref 8.9–12.9)
PROTHROMBIN TIME: 10.3 SEC (ref 9–11.1)
RBC # BLD AUTO: 4.6 M/UL (ref 4.1–5.7)
SPECIMEN EXP DATE BLD: NORMAL
WBC # BLD AUTO: 7.6 K/UL (ref 4.1–11.1)

## 2019-02-27 PROCEDURE — 86900 BLOOD TYPING SEROLOGIC ABO: CPT

## 2019-02-27 PROCEDURE — 77030014115

## 2019-02-27 PROCEDURE — 77030003503 HC NDL BIOP TISS BD -B

## 2019-02-27 PROCEDURE — 74011250636 HC RX REV CODE- 250/636: Performed by: RADIOLOGY

## 2019-02-27 PROCEDURE — 85027 COMPLETE CBC AUTOMATED: CPT

## 2019-02-27 PROCEDURE — 74011250637 HC RX REV CODE- 250/637: Performed by: INTERNAL MEDICINE

## 2019-02-27 PROCEDURE — 36415 COLL VENOUS BLD VENIPUNCTURE: CPT

## 2019-02-27 PROCEDURE — 50200 RENAL BIOPSY PERQ: CPT

## 2019-02-27 PROCEDURE — 85610 PROTHROMBIN TIME: CPT

## 2019-02-27 PROCEDURE — 99218 HC RM OBSERVATION: CPT

## 2019-02-27 PROCEDURE — 77030003666 HC NDL SPINAL BD -A

## 2019-02-27 RX ORDER — CLONIDINE HYDROCHLORIDE 0.2 MG/1
0.2 TABLET ORAL
Status: COMPLETED | OUTPATIENT
Start: 2019-02-27 | End: 2019-02-27

## 2019-02-27 RX ORDER — FUROSEMIDE 20 MG/1
20 TABLET ORAL DAILY
Status: DISCONTINUED | OUTPATIENT
Start: 2019-02-28 | End: 2019-02-28 | Stop reason: HOSPADM

## 2019-02-27 RX ORDER — CLONIDINE HYDROCHLORIDE 0.1 MG/1
0.1 TABLET ORAL
Status: COMPLETED | OUTPATIENT
Start: 2019-02-27 | End: 2019-02-27

## 2019-02-27 RX ORDER — ACETAMINOPHEN 500 MG
500 TABLET ORAL
Status: DISCONTINUED | OUTPATIENT
Start: 2019-02-27 | End: 2019-02-28 | Stop reason: HOSPADM

## 2019-02-27 RX ORDER — AMLODIPINE BESYLATE 5 MG/1
10 TABLET ORAL DAILY
Status: DISCONTINUED | OUTPATIENT
Start: 2019-02-28 | End: 2019-02-28 | Stop reason: HOSPADM

## 2019-02-27 RX ORDER — HYDRALAZINE HYDROCHLORIDE 50 MG/1
50 TABLET, FILM COATED ORAL ONCE
Status: COMPLETED | OUTPATIENT
Start: 2019-02-27 | End: 2019-02-27

## 2019-02-27 RX ORDER — CLONIDINE HYDROCHLORIDE 0.1 MG/1
0.1 TABLET ORAL
Status: DISCONTINUED | OUTPATIENT
Start: 2019-02-27 | End: 2019-02-28 | Stop reason: HOSPADM

## 2019-02-27 RX ORDER — FENTANYL CITRATE 50 UG/ML
25 INJECTION, SOLUTION INTRAMUSCULAR; INTRAVENOUS
Status: DISCONTINUED | OUTPATIENT
Start: 2019-02-27 | End: 2019-02-28 | Stop reason: HOSPADM

## 2019-02-27 RX ORDER — SODIUM CHLORIDE 9 MG/ML
25 INJECTION, SOLUTION INTRAVENOUS ONCE
Status: COMPLETED | OUTPATIENT
Start: 2019-02-27 | End: 2019-02-27

## 2019-02-27 RX ORDER — MIDAZOLAM HYDROCHLORIDE 1 MG/ML
5 INJECTION, SOLUTION INTRAMUSCULAR; INTRAVENOUS
Status: DISCONTINUED | OUTPATIENT
Start: 2019-02-27 | End: 2019-02-28 | Stop reason: HOSPADM

## 2019-02-27 RX ORDER — LORAZEPAM 0.5 MG/1
1 TABLET ORAL
Status: COMPLETED | OUTPATIENT
Start: 2019-02-27 | End: 2019-02-27

## 2019-02-27 RX ORDER — LOSARTAN POTASSIUM 50 MG/1
100 TABLET ORAL DAILY
Status: DISCONTINUED | OUTPATIENT
Start: 2019-02-28 | End: 2019-02-28 | Stop reason: HOSPADM

## 2019-02-27 RX ORDER — METOPROLOL SUCCINATE 25 MG/1
100 TABLET, EXTENDED RELEASE ORAL DAILY
Status: DISCONTINUED | OUTPATIENT
Start: 2019-02-28 | End: 2019-02-28 | Stop reason: HOSPADM

## 2019-02-27 RX ADMIN — CLONIDINE HYDROCHLORIDE 0.1 MG: 0.1 TABLET ORAL at 11:26

## 2019-02-27 RX ADMIN — FENTANYL CITRATE 25 MCG: 50 INJECTION, SOLUTION INTRAMUSCULAR; INTRAVENOUS at 16:20

## 2019-02-27 RX ADMIN — HYDRALAZINE HYDROCHLORIDE 50 MG: 50 TABLET, FILM COATED ORAL at 19:09

## 2019-02-27 RX ADMIN — FENTANYL CITRATE 25 MCG: 50 INJECTION, SOLUTION INTRAMUSCULAR; INTRAVENOUS at 16:12

## 2019-02-27 RX ADMIN — MIDAZOLAM HYDROCHLORIDE 1 MG: 1 INJECTION, SOLUTION INTRAMUSCULAR; INTRAVENOUS at 16:09

## 2019-02-27 RX ADMIN — MIDAZOLAM HYDROCHLORIDE 1 MG: 1 INJECTION, SOLUTION INTRAMUSCULAR; INTRAVENOUS at 15:58

## 2019-02-27 RX ADMIN — CLONIDINE HYDROCHLORIDE 0.2 MG: 0.2 TABLET ORAL at 09:43

## 2019-02-27 RX ADMIN — SODIUM CHLORIDE 25 ML/HR: 900 INJECTION, SOLUTION INTRAVENOUS at 15:40

## 2019-02-27 RX ADMIN — FENTANYL CITRATE 25 MCG: 50 INJECTION, SOLUTION INTRAMUSCULAR; INTRAVENOUS at 15:58

## 2019-02-27 RX ADMIN — FENTANYL CITRATE 25 MCG: 50 INJECTION, SOLUTION INTRAMUSCULAR; INTRAVENOUS at 15:44

## 2019-02-27 RX ADMIN — MIDAZOLAM HYDROCHLORIDE 1 MG: 1 INJECTION, SOLUTION INTRAMUSCULAR; INTRAVENOUS at 16:14

## 2019-02-27 RX ADMIN — CLONIDINE HYDROCHLORIDE 0.1 MG: 0.1 TABLET ORAL at 12:58

## 2019-02-27 RX ADMIN — LORAZEPAM 1 MG: 0.5 TABLET ORAL at 09:47

## 2019-02-27 RX ADMIN — FENTANYL CITRATE 25 MCG: 50 INJECTION, SOLUTION INTRAMUSCULAR; INTRAVENOUS at 16:06

## 2019-02-27 NOTE — H&P
Radiology History and Physical    Patient: Elba Arnett 57 y.o. male     Referring Physician: Sarah Sloan. Chief Complaint: No chief complaint on file. History of Present Illness: Proteinuria. History:    Past Medical History:   Diagnosis Date    Asthma     Calculus of kidney 2005    1 time    Environmental allergies     GERD (gastroesophageal reflux disease)     Gout 06/12 most recent    2 episodes    Hodgkin's disease, unspecified 1997    remission since dx    Hypertension      Family History   Problem Relation Age of Onset    Hypertension Mother     Hypertension Father     Heart Disease Father     Elevated Lipids Father     Cancer Maternal Grandfather 68        lung - smoker & worked in [employer] plant     Social History     Socioeconomic History    Marital status:      Spouse name: Not on file    Number of children: Not on file    Years of education: Not on file    Highest education level: Not on file   Social Needs    Financial resource strain: Not on file    Food insecurity - worry: Not on file    Food insecurity - inability: Not on file   [employer] needs - medical: Not on file   [employer] needs - non-medical: Not on file   Occupational History    Not on file   Tobacco Use    Smoking status: Never Smoker    Smokeless tobacco: Never Used   Substance and Sexual Activity    Alcohol use: Yes     Alcohol/week: 1.2 oz     Types: 2 Standard drinks or equivalent per week    Drug use: No    Sexual activity: Yes     Partners: Female   Other Topics Concern    Not on file   Social History Narrative    Not on file       Allergies: Allergies   Allergen Reactions    Iodine Anaphylaxis     Shrimp and lobster    Shellfish Derived Anaphylaxis     Lip swelling    Pcn [Penicillins] Unknown (comments)     Unsure what happens, childhood reaction       Current Medications:  No current facility-administered medications for this encounter.       No current outpatient medications on file. Facility-Administered Medications Ordered in Other Encounters   Medication Dose Route Frequency    cloNIDine HCl (CATAPRES) tablet 0.1 mg  0.1 mg Oral Q4H PRN    fentaNYL citrate (PF) injection 25 mcg  25 mcg IntraVENous RAD PRN    midazolam (VERSED) injection 5 mg  5 mg IntraVENous RAD PRN    [START ON 2/28/2019] losartan (COZAAR) tablet 100 mg  100 mg Oral DAILY    [START ON 2/28/2019] amLODIPine (NORVASC) tablet 10 mg  10 mg Oral DAILY    [START ON 2/28/2019] metoprolol succinate (TOPROL-XL) XL tablet 100 mg  100 mg Oral DAILY    [START ON 2/28/2019] furosemide (LASIX) tablet 20 mg  20 mg Oral DAILY    acetaminophen (TYLENOL) tablet 500 mg  500 mg Oral Q4H PRN        Physical Exam:  There were no vitals taken for this visit. GENERAL: alert, cooperative, no distress, appears stated age, LUNG: clear to auscultation bilaterally, HEART: regular rate and rhythm      Alerts:    Hospital Problems  Date Reviewed: 2/27/2019    None          Laboratory:      Recent Labs     02/27/19  0909   HGB 13.8   HCT 40.9   WBC 7.6      INR 1.0         Plan of Care/Planned Procedure:  Risks, benefits, and alternatives reviewed with patient and he agrees to proceed with the procedure. Full dictated report to follow.

## 2019-02-27 NOTE — PROGRESS NOTES
TRANSFER - IN REPORT: 
 
Verbal report received from Pat(name) on Nichelle Corpus  being received from [facility](unit) for ordered procedure Report consisted of patients Situation, Background, Assessment and  
Recommendations(SBAR). Information from the following report(s) Procedure Summary was reviewed with the receiving nurse. Opportunity for questions and clarification was provided. Assessment completed upon patients arrival to unit and care assumed.

## 2019-02-27 NOTE — PROGRESS NOTES
Renal biopsy completed. Denies any discomfort. VSS. Dr. Lucy Lenz made aware of increasing BP via phone. DDI. Report called to [facility] regarding biopsy, medications given, VS, puncture site and post orders.

## 2019-02-27 NOTE — H&P
H+P Note NAME: Elizabeth Cason :  1970 MRN:  [id number] Date/Time:  2019 2:43 PM 
 
 
 
 Assessment :    Plan: 
HTN 
H/o Hodgkin's lymphoma Proteinuria Obesity White coat HTN SBP up. He says that he took his pills today and that he is anxious. Of note, his SBP was elevated at his last OV although he said he forgot his labetolol. He says that he checks his BP at home and it is \"good. \" I've ordered several doses of clonidine and one dose of ativan. I was called by IR regarding SBP around 160. I asked that they give him another clonidine. Proceed with biopsy if SBP can get below 150. Subjective: CHIEF COMPLAINT:  \"I'm nervous. \" HISTORY OF PRESENT ILLNESS:    
Rocío Obrien is a 57 y.o.   male who has a history of HTN for 10-15 years in obs for renal biopsy. He is followed by Dr. Timbo Vo. He has had a normal creatinine but an elevated UPCR. Most recently it was about 3000. He says that he used to take NSAID's. But recently stopped. He says that he is nervous and thinks that is why his BP is up. Past Medical History:  
Diagnosis Date  Asthma  Calculus of kidney  1 time  Environmental allergies  GERD (gastroesophageal reflux disease)  Gout  most recent 2 episodes  Hodgkin's disease, unspecified   
 remission since dx  Hypertension Past Surgical History:  
Procedure Laterality Date [address] dissection chest  
 HX APPENDECTOMY    HX ORTHOPAEDIC Social History Tobacco Use  Smoking status: Never Smoker  Smokeless tobacco: Never Used Substance Use Topics  Alcohol use: Yes Alcohol/week: 1.2 oz Types: 2 Standard drinks or equivalent per week Family History Problem Relation Age of Onset  Hypertension Mother  Hypertension Father  Heart Disease Father  Elevated Lipids Father  Cancer Maternal Grandfather 68 lung - smoker & worked in fertilizer plant Allergies Allergen Reactions  Iodine Anaphylaxis Shrimp and lobster  Shellfish Derived Anaphylaxis Lip swelling  Pcn [Penicillins] Unknown (comments) Unsure what happens, childhood reaction Prior to Admission medications Medication Sig Start Date End Date Taking? Authorizing Provider  
furosemide (LASIX) 20 mg tablet Take one tablet daily. 12/5/18  Yes Za Marquez MD  
labetalol (NORMODYNE) 100 mg tablet TAKE 1 TAB BY MOUTH TWO (2) TIMES A DAY FOR 90 DAYS. 9/5/18  Yes Parish Rodriguez, JEANINE  
amLODIPine-valsartan (EXFORGE)  mg per tablet TAKE 1 TAB BY MOUTH DAILY. 4/29/18  Yes Za Marquez MD  
FLUTICASONE PROPIONATE (FLONASE NA) by Nasal route. Yes Provider, Historical  
pravastatin (PRAVACHOL) 40 mg tablet TAKE 1 TAB BY MOUTH NIGHTLY. 2/26/18  Yes Za Marquez MD  
LORATADINE (CLARITIN PO) Take  by mouth. Provider, Historical  
omega-3 acid ethyl esters (LOVAZA) 1 gram capsule Take 2 Caps by mouth two (2) times a day. 3/22/16   Za Marquez MD  
 
REVIEW OF SYSTEMS:   
 []  Unable to obtain reliable ROS due to  [] mental status  [] sedated   [] intubated 
 [x] Total of 12 systems reviewed as follows: 
Constitutional: negative fever, negative chills, negative weight loss Eyes:   negative visual changes ENT:   negative sore throat, tongue or lip swelling Respiratory:  negative cough, negative dyspnea Cards:  negative for chest pain, palpitations, lower extremity edema GI:   negative for nausea, vomiting, diarrhea, and abdominal pain :  negative for frequency, dysuria Integument:  negative for rash and pruritus Heme:  negative for easy bruising and gum/nose bleeding Musculoskel: negative for myalgias,  back pain and muscle weakness Neuro:  negative for headaches, dizziness, vertigo Psych:  negative for feelings of anxiety, depression Travel?: none Objective: VITALS:   
Visit Vitals /88 Pulse 74 Temp 98.1 °F (36.7 °C) Resp 14 SpO2 97% PHYSICAL EXAM: 
Gen:  [x]  WD [x]  WN  [] cachectic []  thin []  obese []  disheveled 
           []  ill apearing  []   Critical  []   Chronic    []  No acute distress HEENT:   [] NC/AT/PERRLA/EOMI 
  [] pink conjunctivae      [] pale conjunctivae PERRL  [] yes  [] no      [] moist mucosa    [] dry mucosa 
  hearing intact to voice [] yes  [] No 
              
NECK:   supple [] yes  [] no        masses [] yes  [] No 
             thyroid  []  non tender  []  tender RESP:   [x] CTA bilaterally/no wheezing/rhonchi/rales/crackles [] rhonchi bilaterally - no dullness  [] wheezing   [] rhonchi   [] crackles  
  use of accessory muscles [] yes [] no CARD:   [x]  regular rate and rhythm/No murmurs/rubs/gallops 
  murmur  [] yes ()  [] no      Rubs  [] yes  [] no       Gallops [] yes  [] no 
  Rate []  regular  []  irregular        carotid bruits  [] Right  []  Left LE edema [] yes  [x] no           JVP  []  yes   []  no 
 
ABD:    [x] soft/non distended/non tender/+bowel sounds/no HSM []  Rigid    tenderness [] yes [] no   Liver enlargement  []  yes []  no  
             Spleen enlargement  []  yes []  no     distended []  yes [] no  
  bowel sound  [] hypoactive   [] hyperactive LYMPH:    Neck []  yes [x]  no       Axillae []  yes []  no SKIN:   Rashes []  yes   [x]  no    Ulcers []  yes   [x]  no 
             [] tight to palpitation    skin turgor []  good  [] poor  [] decreased Cyanosis/clubbing []  yes []  no 
 
NEUR:   [x] cranial nerves II-XII grossly intact    
  [] Cranial nerves deficit 
               []  facial droop    []  slurred speech   [] aphasic  
  [] Strength normal     []  weakness  []  LUE  []   RUE/ []  LLE  []   RLE 
  follows commands  [x]  yes []  no        
 
PSYCH:   insight [] poor [x] good   Alert and Oriented to  [x] person  [x] place  [x]  time [] depressed [] anxious [] agitated  [] lethargic [] stuporous  [] sedated LAB DATA REVIEWED:   
Recent Labs  
  02/27/19 
0909 WBC 7.6 HGB 13.8 HCT 40.9  No results for input(s): NA, K, CL, CO2, BUN, CREA, GLU, CA, MG, PHOS, URICA in the last 72 hours. No results for input(s): SGOT, GPT, ALT, AP, TBIL, TBILI, ALB, GLOB, GGT, AML, LPSE in the last 72 hours. No lab exists for component: AMYP, HLPSE Recent Labs  
  02/27/19 
6812 INR 1.0 PTP 10.3 No results for input(s): FE, TIBC, PSAT, FERR in the last 72 hours. No results for input(s): PH, PCO2, PO2 in the last 72 hours. No results for input(s): CPK, CKMB in the last 72 hours. No lab exists for component: TROPONINI No results found for: Mratell Millard Procedures: see electronic medical records for all procedures/Xrays and details which were not copied into this note but were reviewed prior to creation of Plan.   
________________________________________________________________________ 
  
 
___________________________________________________ Consulting Physician: Barbie Mane MD

## 2019-02-27 NOTE — PROGRESS NOTES
Transport present to return patient to 505 via stretcher. Patient aware to remain flat x 6 hours post procedure.

## 2019-02-28 VITALS
HEIGHT: 67 IN | DIASTOLIC BLOOD PRESSURE: 94 MMHG | SYSTOLIC BLOOD PRESSURE: 147 MMHG | OXYGEN SATURATION: 97 % | BODY MASS INDEX: 32.96 KG/M2 | HEART RATE: 89 BPM | TEMPERATURE: 97.7 F | RESPIRATION RATE: 20 BRPM | WEIGHT: 210 LBS

## 2019-02-28 LAB
COMMENT, HOLDF: NORMAL
ERYTHROCYTE [DISTWIDTH] IN BLOOD BY AUTOMATED COUNT: 13.6 % (ref 11.5–14.5)
HCT VFR BLD AUTO: 43.2 % (ref 36.6–50.3)
HGB BLD-MCNC: 14.1 G/DL (ref 12.1–17)
MCH RBC QN AUTO: 29.3 PG (ref 26–34)
MCHC RBC AUTO-ENTMCNC: 32.6 G/DL (ref 30–36.5)
MCV RBC AUTO: 89.6 FL (ref 80–99)
NRBC # BLD: 0 K/UL (ref 0–0.01)
NRBC BLD-RTO: 0 PER 100 WBC
PLATELET # BLD AUTO: 203 K/UL (ref 150–400)
PMV BLD AUTO: 10.7 FL (ref 8.9–12.9)
RBC # BLD AUTO: 4.82 M/UL (ref 4.1–5.7)
SAMPLES BEING HELD,HOLD: NORMAL
WBC # BLD AUTO: 9.5 K/UL (ref 4.1–11.1)

## 2019-02-28 PROCEDURE — 85027 COMPLETE CBC AUTOMATED: CPT

## 2019-02-28 PROCEDURE — 36415 COLL VENOUS BLD VENIPUNCTURE: CPT

## 2019-02-28 PROCEDURE — 74011250637 HC RX REV CODE- 250/637: Performed by: INTERNAL MEDICINE

## 2019-02-28 PROCEDURE — 99218 HC RM OBSERVATION: CPT

## 2019-02-28 RX ORDER — HYDRALAZINE HYDROCHLORIDE 50 MG/1
50 TABLET, FILM COATED ORAL ONCE
Status: COMPLETED | OUTPATIENT
Start: 2019-02-28 | End: 2019-02-28

## 2019-02-28 RX ORDER — HYDRALAZINE HYDROCHLORIDE 50 MG/1
50 TABLET, FILM COATED ORAL 3 TIMES DAILY
Status: DISCONTINUED | OUTPATIENT
Start: 2019-02-28 | End: 2019-02-28 | Stop reason: HOSPADM

## 2019-02-28 RX ORDER — HYDRALAZINE HYDROCHLORIDE 50 MG/1
50 TABLET, FILM COATED ORAL 3 TIMES DAILY
Status: DISCONTINUED | OUTPATIENT
Start: 2019-03-01 | End: 2019-02-28

## 2019-02-28 RX ADMIN — HYDRALAZINE HYDROCHLORIDE 50 MG: 50 TABLET, FILM COATED ORAL at 06:07

## 2019-02-28 RX ADMIN — CLONIDINE HYDROCHLORIDE 0.1 MG: 0.1 TABLET ORAL at 01:07

## 2019-02-28 NOTE — DISCHARGE SUMMARY
Discharge Summary    NAME: Morris Halsted   :  1970   MRN:  [id number]     DISCHARGE DIAGNOSIS:  proteinuria    CONSULTATIONS:  None    Follow Up: Follow-up Information     Follow up With Specialties Details Why Contact Info    Marino Oppenheim, MD Nephrology Call in 3 days  [address] [facility]  [address]  [phone]            Procedures: see electronic medical records for all procedures/Xrays and details which were not copied into this note but were reviewed prior to creation of Plan. Please follow-up tests/labs that are still pendin. Renal biopsy    PMH/ reviewed - no change compared to H&P    DISCHARGE SUMMARY/HOSPITAL COURSE: for full details see H&P, daily progress notes, labs, consult notes. Briefly As Per HPI:   Pt with proteinuria in obs for renal biopsy. BP was up and he was given clonidine and ativan and BP improved. H/H stable overnight. BP not bad this AM.  No hematuria. OK for D/C. The patient's hospital course was complicated by:  nothing      _______________________________________________________________________   Patient seen and examined by me on day of discharge. Pertinent findings are:  Gen:NAD  HEENT:NCAT  Chest:CTA  Cv:RRR  Abd:sift  Neuro:nonfocal    See Discharge Instructions for further details.   _______________________________________________________________________    Medications Reviewed:  Current Discharge Medication List        _______________________________________________________________________    Risk of deterioration: Low  ________________________________________________________________________    Disposition  Home with family, no needs  ________________________________________________________________________    Care Plan discussed with:   Patient  ________________________________________________________________________    Code Status: Full Code  ________________________________________________________________________    Total time spent in discharge (min):    ________________________________________________________________________    CDMP Checked: Yes    Signed: Mikayla Díaz III, MD    This note will not be viewable in MyChart.

## 2019-02-28 NOTE — DISCHARGE INSTRUCTIONS
Patient Education        Needle Kidney Biopsy: What to Expect at Home  Your Recovery    After a needle kidney biopsy, you will be told to lie down on your back for several hours. After this, you should avoid strenuous activity for the next 2 to 3 days. It's normal to feel some soreness in the area of the biopsy for 2 to 3 days. You may have a small amount of bleeding on the bandage after the biopsy. You may notice some blood in your urine after the biopsy. This should go away within 12 to 24 hours. If it doesn't, call your doctor. If you are feeling well, you should be able to get back to work within a week. But avoid strenuous activity, such as heavy lifting, for up to another week. This care sheet gives you a general idea about how long it will take for you to recover. But each person recovers at a different pace. Follow the steps below to feel better as quickly as possible. How can you care for yourself at home? Activity    · Avoid lifting anything that would make you strain. This may include heavy grocery bags and milk containers, a heavy briefcase or backpack, cat litter or dog food bags, a vacuum , or a child.     · Avoid strenuous activities, such as bicycle riding, jogging, weight lifting, or aerobic exercise, until your doctor says it is okay.     · Try to walk each day. Start by walking a little more than you did the day before. Bit by bit, increase the amount you walk. Walking boosts blood flow and helps prevent pneumonia and constipation.     · Rest when you feel tired. Getting enough sleep will help you recover.     · Ask your doctor when it is okay for you to have sex. Diet    · You can eat your normal diet. If your stomach is upset, try bland, low-fat foods like plain rice, broiled chicken, toast, and yogurt.     · Drink plenty of fluids to avoid becoming dehydrated. Medicines    · Your doctor will tell you if and when you can restart your medicines.  He or she will also give you instructions about taking any new medicines.     · If you take blood thinners, such as warfarin (Coumadin), clopidogrel (Plavix), or aspirin, be sure to talk to your doctor. He or she will tell you if and when to start taking those medicines again. Make sure that you understand exactly what your doctor wants you to do.     · Do not take aspirin or anti-inflammatory medicines for a week after the biopsy. Incision care    · Keep a bandage over the puncture site for the first 1 or 2 days. Follow-up care is a key part of your treatment and safety. Be sure to make and go to all appointments, and call your doctor if you are having problems. It's also a good idea to know your test results and keep a list of the medicines you take. When should you call for help? Call 911 anytime you think you may need emergency care. For example, call if:    · You passed out (lost consciousness).    Call your doctor now or seek immediate medical care if:    · You have signs of infection, such as:  ? Increased pain, swelling, warmth, or redness. ? Red streaks leading from the puncture site. ? Pus draining from the puncture site. ? A fever.     · Bright red blood has soaked through the bandage over the puncture site.     · You have new or worse pain at the puncture site.    Watch closely for any changes in your health, and call your doctor if:    · You have blood in your urine for more than 1 day. Where can you learn more? Go to http://ifrah-demetrio.info/. Enter S675 in the search box to learn more about \"Needle Kidney Biopsy: What to Expect at Home. \"  Current as of: March 14, 2018  Content Version: 11.9  © 6823-0437 [employer]. Care instructions adapted under license by [employer] (which disclaims liability or warranty for this information).  If you have questions about a medical condition or this instruction, always ask your healthcare professional. [address]  any warranty or liability for your use of this information.

## 2019-02-28 NOTE — PROGRESS NOTES
Bedside and Verbal shift change report given to Howard Quintana RN (oncoming nurse) by Deepa Jeffery RN (offgoing nurse). Report included the following information SBAR, Kardex, ED Summary, Procedure Summary, MAR and Recent Results.

## 2019-02-28 NOTE — PROGRESS NOTES
Renal - 
 
Called yesterday evening and again this morning with high BP. Added Hydralazine 50 mg po TID to his current BP medications. Tayla Ricardo

## 2019-02-28 NOTE — PROGRESS NOTES
NAME: Carlito Melendez :  1970 MRN:  [id number] Assessment :    Plan: 
--proteinuria --S/P renal biopsy. BP OK this AM.  H/H stable. Feels fine. D/C and f/u with Dr. Sedrick Vale. Subjective: Chief Complaint:  \" I'm not going to take the BP pills here. I'll take them at home. \"   No dysuria. No flank pain. No dyspnea. No N/V. Tired of being in bed. Review of Systems: 
 
Symptom Y/N Comments  Symptom Y/N Comments Fever/Chills    Chest Pain Poor Appetite    Edema Cough    Abdominal Pain Sputum    Joint Pain SOB/BAUMANN    Pruritis/Rash Nausea/vomit    Tolerating PT/OT Diarrhea    Tolerating Diet Constipation    Other Could not obtain due to:   
 
Objective: VITALS:  
Last 24hrs VS reviewed since prior progress note. Most recent are: 
Visit Vitals BP (!) 147/94 (BP 1 Location: Right arm, BP Patient Position: Sitting) Pulse 89 Temp 97.7 °F (36.5 °C) Resp 20 Ht 5' 7\" (1.702 m) Wt 95.3 kg (210 lb) SpO2 97% BMI 32.89 kg/m² Intake/Output Summary (Last 24 hours) at 2019 1001 Last data filed at 2019 0900 Gross per 24 hour Intake  Output 950 ml Net -950 ml Telemetry Reviewed: PHYSICAL EXAM: 
General: NAD No edema Lab Data Reviewed: (see below) Medications Reviewed: (see below) PMH/SH reviewed - no change compared to H&P 
________________________________________________________________________ Care Plan discussed with: 
Patient Family RN Care Manager Consultant:     
 
  Comments >50% of visit spent in counseling and coordination of care    
 
________________________________________________________________________ Efren Devlin MD  
 
Procedures: see electronic medical records for all procedures/Xrays and details which 
 were not copied into this note but were reviewed prior to creation of Plan. LABS: 
Recent Labs  
  02/28/19 
0057 02/27/19 
5165 WBC 9.5 7.6 HGB 14.1 13.8 HCT 43.2 40.9  185 No results for input(s): NA, K, CL, CO2, BUN, CREA, GLU, CA, MG, PHOS, URICA in the last 72 hours. No results for input(s): SGOT, GPT, AP, TBIL, TP, ALB, GLOB, GGT, AML, LPSE in the last 72 hours. No lab exists for component: AMYP, HLPSE Recent Labs  
  02/27/19 
9981 INR 1.0 PTP 10.3 No results for input(s): FE, TIBC, PSAT, FERR in the last 72 hours. No results found for: FOL, RBCF No results for input(s): PH, PCO2, PO2 in the last 72 hours. No results for input(s): CPK, CKMB in the last 72 hours. No lab exists for component: TROPONINI No components found for: [place] Lab Results Component Value Date/Time Color YELLOW 12/16/2009 04:05 PM  
 Appearance CLEAR 12/16/2009 04:05 PM  
 Specific gravity 1.021 12/16/2009 04:05 PM  
 pH (UA) 6.0 12/16/2009 04:05 PM  
 Protein 30 (A) 12/16/2009 04:05 PM  
 Glucose NEGATIVE  12/16/2009 04:05 PM  
 Ketone NEGATIVE  12/16/2009 04:05 PM  
 Bilirubin NEGATIVE  12/16/2009 04:05 PM  
 Urobilinogen 0.2 12/16/2009 04:05 PM  
 Nitrites NEGATIVE  12/16/2009 04:05 PM  
 Leukocyte Esterase NEGATIVE  12/16/2009 04:05 PM  
 Epithelial cells 0-5 12/16/2009 04:05 PM  
 Bacteria NEGATIVE  12/16/2009 04:05 PM  
 WBC 0-4 12/16/2009 04:05 PM  
 RBC 0-3 12/16/2009 04:05 PM  
 
 
MEDICATIONS: 
Current Facility-Administered Medications Medication Dose Route Frequency  hydrALAZINE (APRESOLINE) tablet 50 mg  50 mg Oral TID  cloNIDine HCl (CATAPRES) tablet 0.1 mg  0.1 mg Oral Q4H PRN  
 fentaNYL citrate (PF) injection 25 mcg  25 mcg IntraVENous RAD PRN  
 midazolam (VERSED) injection 5 mg  5 mg IntraVENous RAD PRN  
 losartan (COZAAR) tablet 100 mg  100 mg Oral DAILY  amLODIPine (NORVASC) tablet 10 mg  10 mg Oral DAILY  metoprolol succinate (TOPROL-XL) XL tablet 100 mg  100 mg Oral DAILY  furosemide (LASIX) tablet 20 mg  20 mg Oral DAILY  acetaminophen (TYLENOL) tablet 500 mg  500 mg Oral Q4H PRN

## 2019-05-13 ENCOUNTER — OFFICE VISIT (OUTPATIENT)
Dept: INTERNAL MEDICINE CLINIC | Age: 49
End: 2019-05-13

## 2019-05-13 VITALS
RESPIRATION RATE: 20 BRPM | DIASTOLIC BLOOD PRESSURE: 108 MMHG | HEART RATE: 76 BPM | SYSTOLIC BLOOD PRESSURE: 160 MMHG | WEIGHT: 223 LBS | OXYGEN SATURATION: 99 % | HEIGHT: 67 IN | TEMPERATURE: 98.6 F | BODY MASS INDEX: 35 KG/M2

## 2019-05-13 DIAGNOSIS — E66.9 OBESITY (BMI 30.0-34.9): ICD-10-CM

## 2019-05-13 DIAGNOSIS — I10 ESSENTIAL HYPERTENSION: Primary | ICD-10-CM

## 2019-05-13 RX ORDER — LABETALOL 200 MG/1
TABLET, FILM COATED ORAL
Refills: 1 | COMMUNITY
Start: 2019-02-11 | End: 2019-05-15

## 2019-05-13 NOTE — PROGRESS NOTES
HISTORY OF PRESENT ILLNESS  Elise Bowers is a 57 y.o. male. HPI  Patient presents to the office to get established. His current PCP (Dr. Benjy Belcher) is retiring. Mr. Francisco Maier reports he is still under the care of Dr. Josselyn Can (nephrologist). He was sent to Dr. oJsselyn Can because when he had an exam done for an insurance policy it came back positive with protein in his urine. He recently had a renal biopsy done in February. He reports nothing was found to be abnormal. It is thought the protein was present due to hypertension and the use of nsaid's. Since seeing Dr. Josselyn Can the protein has improved but still present. Mr. Francisco Maier likes to cycle. He has a healthy diet but admits he could do better with portion size. reports he has been on a number of different blood pressure medications in the past but none really seems to bring down his blood pressure. Allergies   Allergen Reactions    Iodine Anaphylaxis     Shrimp and lobster    Shellfish Derived Anaphylaxis     Lip swelling    Pcn [Penicillins] Unknown (comments)     Unsure what happens, childhood reaction     Past Medical History:   Diagnosis Date    Asthma     Calculus of kidney 2005    1 time    Environmental allergies     GERD (gastroesophageal reflux disease)     Gout 06/12 most recent    2 episodes    Hodgkin's disease, unspecified 1997    remission since dx    Hypertension      Social History     Socioeconomic History    Marital status:      Spouse name: Not on file    Number of children: Not on file    Years of education: Not on file    Highest education level: Not on file   Tobacco Use    Smoking status: Never Smoker    Smokeless tobacco: Never Used   Substance and Sexual Activity    Alcohol use:  Yes     Alcohol/week: 1.2 oz     Types: 2 Standard drinks or equivalent per week     Frequency: 2-4 times a month     Drinks per session: 1 or 2     Binge frequency: Less than monthly     Comment: all    Drug use: No    Sexual activity: Yes     Partners: Female     Birth control/protection: None     Past Surgical History:   Procedure Laterality Date    CHEST SURGERY PROCEDURE UNLISTED  1997    LN dissection chest    HX APPENDECTOMY  1996    HX ORTHOPAEDIC       Family History   Problem Relation Age of Onset    Hypertension Mother     Hypertension Father     Heart Disease Father     Elevated Lipids Father     Cancer Maternal Grandfather 68        lung - smoker & worked in [employer] plant       Review of Systems   Respiratory: Negative for cough and shortness of breath. Cardiovascular: Negative for chest pain and leg swelling. All other systems reviewed and are negative. Blood pressure (!) 160/108, pulse 76, temperature 98.6 °F (37 °C), temperature source Oral, resp. rate 20, height 5' 7\" (1.702 m), weight 223 lb (101.2 kg), SpO2 99 %. Physical Exam   Constitutional: He appears well-developed and well-nourished. Pleasant, overweight. Cardiovascular: Normal rate and regular rhythm. No murmur heard. Pulmonary/Chest: Effort normal and breath sounds normal. He has no wheezes. Psychiatric: He has a normal mood and affect. His behavior is normal. Judgment and thought content normal.       ASSESSMENT and PLAN  Diagnoses and all orders for this visit:    1. Essential hypertension  -     REFERRAL TO CARDIOLOGY    2. Obesity (BMI 30.0-34.9)    I explained to the patient that he is doing the right thing with exercising and eating healthy. He could definitely work on losing weight which would help his blood pressure. I think at this time it would make since for him to see the cardiologist about his blood pressure. He will be seeing Dr. Jason Andrade in 3-4 weeks and at that time will get labs. All this was discussed with the patient and he understands and agrees.

## 2019-05-13 NOTE — PROGRESS NOTES
Blood Pressure issues, patient needs refills, patient states his BP does not move it stays high. Patient does not know what a typical BP is for him, he states always high, patient states not symptoms. Dr. Ashok Verdugo retired, patient live close to here. Patient had a renal biopsy in Feb/2019.

## 2019-05-15 ENCOUNTER — OFFICE VISIT (OUTPATIENT)
Dept: CARDIOLOGY CLINIC | Age: 49
End: 2019-05-15

## 2019-05-15 VITALS
SYSTOLIC BLOOD PRESSURE: 160 MMHG | RESPIRATION RATE: 14 BRPM | HEIGHT: 67 IN | HEART RATE: 78 BPM | BODY MASS INDEX: 35.16 KG/M2 | OXYGEN SATURATION: 98 % | WEIGHT: 224 LBS | DIASTOLIC BLOOD PRESSURE: 100 MMHG

## 2019-05-15 DIAGNOSIS — I10 ESSENTIAL HYPERTENSION: Primary | ICD-10-CM

## 2019-05-15 DIAGNOSIS — E66.01 SEVERE OBESITY (BMI 35.0-39.9) WITH COMORBIDITY (HCC): ICD-10-CM

## 2019-05-15 RX ORDER — LABETALOL 200 MG/1
400 TABLET, FILM COATED ORAL 2 TIMES DAILY
Qty: 360 TAB | Refills: 0 | Status: SHIPPED | OUTPATIENT
Start: 2019-05-15

## 2019-05-15 NOTE — PROGRESS NOTES
Labetalol dose doubled, per Dr. Crissy Mendes verbal order. Patient will take 400 mg bid. Patient stated he will finish off his what he has and let us know if he needs a refill. Sleep medicine referral put in. Patient will f/u with Dr. Jazmine Brewer in 1 month.

## 2019-05-15 NOTE — PROGRESS NOTES
HISTORY OF PRESENT ILLNESS  Jerry Negrete is a 57 y.o. male     SUMMARY:   Problem List  Date Reviewed: 5/15/2019          Codes Class Noted    Proteinuria ICD-10-CM: R80.9  ICD-9-CM: 791.0  2/27/2019        Severe obesity (BMI 35.0-39. 9) with comorbidity ([facility] ICD-10-CM: E66.01  ICD-9-CM: 278.01  4/19/2018        Obesity, Class II, BMI 35-39.9, with comorbidity ICD-10-CM: ASL3732  ICD-9-CM: Delfina Ken[place]  2/26/2018        Unspecified vitamin D deficiency ICD-10-CM: E55.9  ICD-9-CM: 268.9  7/17/2015        Hypertriglyceridemia ICD-10-CM: E78.1  ICD-9-CM: 272.1  9/12/2014        HTN (hypertension) ICD-10-CM: I10  ICD-9-CM: 401.9  12/18/2012        Hodgkin's lymphoma ([facility] ICD-10-CM: C81.90  ICD-9-CM: 201.90  12/18/2012    Overview Signed 12/18/2012  3:32 PM by Melissa Ribera NP     1997             Asthma ICD-10-CM: J45.909  ICD-9-CM: 493.90  12/18/2012        GERD (gastroesophageal reflux disease) ICD-10-CM: K21.9  ICD-9-CM: 530.81  12/18/2012        Gout ICD-10-CM: M10.9  ICD-9-CM: 274.9  12/18/2012              Current Outpatient Medications on File Prior to Visit   Medication Sig    labetalol (NORMODYNE) 200 mg tablet TAKE 1 TABLET BY MOUTH TWICE A DAY    furosemide (LASIX) 20 mg tablet Take one tablet daily.  amLODIPine-valsartan (EXFORGE)  mg per tablet TAKE 1 TAB BY MOUTH DAILY.  FLUTICASONE PROPIONATE (FLONASE NA) 1 Weaver by Nasal route daily as needed.  LORATADINE (CLARITIN PO) Take 10 mg by mouth daily as needed.  pravastatin (PRAVACHOL) 40 mg tablet TAKE 1 TAB BY MOUTH NIGHTLY. No current facility-administered medications on file prior to visit. CARDIOLOGY STUDIES TO DATE:  None      Chief Complaint   Patient presents with    Hypertension     HPI :  Mr. Abbey Weldon is a 57year-old referred by his primary care physician for evaluation of high blood pressure.   He has had high blood pressure for more than ten years and over the last six months, his pressure has been elevated in spite of the addition of Labetalol. He had been on Hydrochlorothiazide in the past at some point, most recently been started on Lasix by his nephrologist.  He had proteinuria and that evaluation led to kidney biopsy a couple of months ago and he was told that there was no evidence of intrinsic renal disease. He has hypertension as I mentioned. He also has hyperlipidemia. He has never smoked. There is no history of diabetes. Family history is negative for premature coronary disease. He does exercise regularly, biking, and has lost six or seven pounds in the last couple of months, but is still significantly overweight. He snores, but does not have any other obvious symptoms of sleep apnea. He says he limits his salt intake, but he does salt his food and reads labels, but not specifically for sodium. He has some mild muscle aches and pains and occasionally feels anxious. CARDIAC ROS:   negative for chest pain, dyspnea, palpitations, syncope, orthopnea, paroxysmal nocturnal dyspnea, exertional chest pressure/discomfort, claudication, lower extremity edema    Family History   Problem Relation Age of Onset    Hypertension Mother     Hypertension Father     Heart Disease Father     Elevated Lipids Father     Cancer Maternal Grandfather 68        lung - smoker & worked in [employer] plant       Past Medical History:   Diagnosis Date    Asthma     Calculus of kidney 2005    1 time    Environmental allergies     GERD (gastroesophageal reflux disease)     Gout 06/12 most recent    2 episodes    Hodgkin's disease, unspecified 1997    remission since dx    Hypertension        GENERAL ROS:  A comprehensive review of systems was negative except for that written in the HPI.     Visit Vitals  BP (!) 160/100 (BP 1 Location: Left arm, BP Patient Position: Sitting)   Pulse 78   Resp 14   Ht 5' 7\" (1.702 m)   Wt 224 lb (101.6 kg)   SpO2 98%   BMI 35.08 kg/m²       Wt Readings from Last 3 Encounters: 05/15/19 224 lb (101.6 kg)   05/13/19 223 lb (101.2 kg)   02/27/19 210 lb (95.3 kg)            BP Readings from Last 3 Encounters:   05/15/19 (!) 160/100   05/13/19 (!) 160/108   02/28/19 (!) 147/94       PHYSICAL EXAM  General appearance: alert, cooperative, no distress, appears stated age  Neurologic: Alert and oriented X 3  Neck: supple, symmetrical, trachea midline, no adenopathy, no carotid bruit and no JVD  Lungs: clear to auscultation bilaterally  Heart: regular rate and rhythm, S1, S2 normal, no murmur, click, rub or gallop  Abdomen: soft, non-tender. Bowel sounds normal. No masses,  no organomegaly  Extremities: extremities normal, atraumatic, no cyanosis or edema  Pulses: 2+ and symmetric    Lab Results   Component Value Date/Time    Cholesterol, total 223 (H) 12/16/2009 03:40 AM    HDL Cholesterol 35 (L) 12/16/2009 03:40 AM    LDL, calculated 103 (H) 12/16/2009 03:40 AM    Triglyceride 425 (H) 12/16/2009 03:40 AM    CHOL/HDL Ratio 6.4 (H) 12/16/2009 03:40 AM     ASSESSMENT  Mr. Ciara Jacome most likely has essential hypertension since it runs in his family and I am not sure why things have changed here in the last 8 to 12 months. He has a follow-up appointment with Dr. Aisha Barcenas, his nephrologist tomorrow and I told him he needs to discuss with him about the possible secondary causes of hypertension. In the meantime, we are going to double his Labetalol dose. We talked about label reading and 2000 mg a day of sodium maximum and I am going to refer him for a sleep evaluation. I encouraged him to continue to exercise and lose weight, which should help as well. current treatment plan is effective, no change in therapy  lab results and schedule of future lab studies reviewed with patient  reviewed diet, exercise and weight control    Encounter Diagnoses   Name Primary?  Essential hypertension Yes    Severe obesity (BMI 35.0-39. 9) with comorbidity ([address]      Orders Placed This Encounter    AMB POC EKG ROUTINE W/ 12 LEADS, INTER & REP       Follow-up and Dispositions    · Return in about 1 month (around 6/12/2019).          Jaqueline Barraza MD  5/15/2019

## 2020-12-23 ENCOUNTER — NURSE TRIAGE (OUTPATIENT)
Dept: OTHER | Facility: CLINIC | Age: 50
End: 2020-12-23

## 2020-12-23 LAB — SARS-COV-2, NAA: NEGATIVE

## 2020-12-23 NOTE — TELEPHONE ENCOUNTER
Reason for Disposition   COVID-19 Testing, questions about    Answer Assessment - Initial Assessment Questions  1. COVID-19 DIAGNOSIS: \"Who made your Coronavirus (COVID-19) diagnosis? \" \"Was it confirmed by a positive lab test?\" If not diagnosed by a HCP, ask \"Are there lots of cases (community spread) where you live? \" (See public health department website, if unsure)     No diagnosis yet. 2. COVID-19 EXPOSURE: \"Was there any known exposure to COVID before the symptoms began? \" CDC Definition of close contact: within 6 feet (2 meters) for a total of 15 minutes or more over a 24-hour period. No known exposure. 3. ONSET: \"When did the COVID-19 symptoms start? \"       Last night. 4. WORST SYMPTOM: \"What is your worst symptom? \" (e.g., cough, fever, shortness of breath, muscle aches)      No energy and body aches. 5. COUGH: \"Do you have a cough? \" If so, ask: \"How bad is the cough? \"       No     6. FEVER: \"Do you have a fever? \" If so, ask: \"What is your temperature, how was it measured, and when did it start? \"      99.5    7. RESPIRATORY STATUS: \"Describe your breathing? \" (e.g., shortness of breath, wheezing, unable to speak)       Normal     8. BETTER-SAME-WORSE: Tiffanie Barrett you getting better, staying the same or getting worse compared to yesterday? \"  If getting worse, ask, \"In what way? \"      Steadily gotten worse. 9. HIGH RISK DISEASE: \"Do you have any chronic medical problems? \" (e.g., asthma, heart or lung disease, weak immune system, obesity, etc.)      High blood pressure, childhood asthma, and obesity. 10. PREGNANCY: \"Is there any chance you are pregnant? \" \"When was your last menstrual period? \"        N/a     11. OTHER SYMPTOMS: \"Do you have any other symptoms? \"  (e.g., chills, fatigue, headache, loss of smell or taste, muscle pain, sore throat; new loss of smell or taste especially support the diagnosis of COVID-19)        Headache, fatigue, muscle pain.     Protocols used: CORONAVIRUS (COVID-19) DIAGNOSED OR SUSPECTED-ADULT-AH    Brief description of triage: see above    Triage indicates for patient to covid testing. Care advice provided, patient verbalizes understanding; denies any other questions or concerns; instructed to call back for any new or worsening symptoms. Writer provided warm transfer to [facility] for appointment scheduling. Attention Provider: Thank you for allowing me to participate in the care of your patient. The patient was connected to triage in response to information provided to the [facility]. Please do not respond through this encounter as the response is not directed to a shared pool.